# Patient Record
Sex: MALE | Race: WHITE | NOT HISPANIC OR LATINO | Employment: FULL TIME | URBAN - METROPOLITAN AREA
[De-identification: names, ages, dates, MRNs, and addresses within clinical notes are randomized per-mention and may not be internally consistent; named-entity substitution may affect disease eponyms.]

---

## 2018-02-13 ENCOUNTER — OFFICE VISIT (OUTPATIENT)
Dept: URGENT CARE | Facility: CLINIC | Age: 28
End: 2018-02-13
Payer: COMMERCIAL

## 2018-02-13 VITALS
OXYGEN SATURATION: 98 % | HEART RATE: 82 BPM | DIASTOLIC BLOOD PRESSURE: 77 MMHG | WEIGHT: 215 LBS | SYSTOLIC BLOOD PRESSURE: 117 MMHG | BODY MASS INDEX: 34.55 KG/M2 | RESPIRATION RATE: 18 BRPM | TEMPERATURE: 97.8 F | HEIGHT: 66 IN

## 2018-02-13 DIAGNOSIS — M62.838 MUSCLE SPASM: Primary | ICD-10-CM

## 2018-02-13 PROCEDURE — 99213 OFFICE O/P EST LOW 20 MIN: CPT | Performed by: FAMILY MEDICINE

## 2018-02-13 RX ORDER — CYCLOBENZAPRINE HCL 10 MG
10 TABLET ORAL 3 TIMES DAILY PRN
Status: DISCONTINUED | OUTPATIENT
Start: 2018-02-13 | End: 2018-02-13

## 2018-02-13 RX ORDER — METHYLPREDNISOLONE 4 MG/1
TABLET ORAL
Qty: 21 TABLET | Refills: 0 | Status: ON HOLD | OUTPATIENT
Start: 2018-02-13 | End: 2020-02-20 | Stop reason: ALTCHOICE

## 2018-02-13 RX ORDER — CYCLOBENZAPRINE HCL 10 MG
10 TABLET ORAL 3 TIMES DAILY PRN
Qty: 30 TABLET | Refills: 0 | Status: ON HOLD | OUTPATIENT
Start: 2018-02-13 | End: 2020-02-20 | Stop reason: ALTCHOICE

## 2018-02-13 NOTE — PATIENT INSTRUCTIONS
We are treating this as a spasm of the right paralumbar muscle group  Use medications as written  Heat or ice as tolerated  Take it easy for the next 2-3 days

## 2018-02-13 NOTE — PROGRESS NOTES
Assessment/Plan:      Diagnoses and all orders for this visit:    Muscle spasm  -     Discontinue: cyclobenzaprine (FLEXERIL) tablet 10 mg; Take 1 tablet (10 mg total) by mouth 3 (three) times a day as needed for muscle spasms   -     Methylprednisolone 4 MG TBPK; Use as directed on package  -     cyclobenzaprine (FLEXERIL) 10 mg tablet; Take 1 tablet (10 mg total) by mouth 3 (three) times a day as needed for muscle spasms          Subjective:     Patient ID: Chey Morrison is a 25 y o  male  Patient is a 41-year-old male  who what Friday when he got into his truck good felt some increased discomfort in the right mid back  He moves tires  Does not recall a specific injury  He has had pain all weekend  He has been quite limited  There is no radiation of the pain into the legs or buttocks  Review of Systems   Constitutional: Negative  HENT: Negative  Eyes: Negative  Respiratory: Negative  Cardiovascular: Negative  Musculoskeletal: Positive for back pain and myalgias  Objective:     Physical Exam   Constitutional: He appears well-developed and well-nourished  HENT:   Head: Normocephalic and atraumatic  Eyes: Conjunctivae are normal  Pupils are equal, round, and reactive to light  Neck: Normal range of motion  Pulmonary/Chest: Effort normal    Musculoskeletal:   He is moving slowly and stiffly  There is increased spasm in the right paralumbar muscle area

## 2018-02-13 NOTE — LETTER
February 15, 2018 ret a had slight edema so with fluid he is at she did she did urn to work in 2-3 days if able  Otherwise follow-up with your family physician  Patient: Carrie Course   YOB: 1990   Date of Visit: 2/13/2018       To Whom It May Concern: It is my medical opinion that Carrie Course a few right and left flank CC reflex hand and right leg and read right got reason  If you have any questions or concerns, please don't hesitate to call           Sincerely,        Eddie Meyer MD    CC: Referral Self

## 2018-02-13 NOTE — LETTER
February 15, 2018     Patient: Ruma Taylor   YOB: 1990   Date of Visit: 2/13/2018       To Whom It May Concern: It is my medical opinion that Ruma Taylor may return to work on 02/15/2018  If you have any questions or concerns, please don't hesitate to call           Sincerely,        Derrick Ramírez MD    CC: No Recipients

## 2020-02-20 ENCOUNTER — APPOINTMENT (EMERGENCY)
Dept: RADIOLOGY | Facility: HOSPITAL | Age: 30
DRG: 603 | End: 2020-02-20
Payer: COMMERCIAL

## 2020-02-20 ENCOUNTER — HOSPITAL ENCOUNTER (INPATIENT)
Facility: HOSPITAL | Age: 30
LOS: 5 days | Discharge: HOME | DRG: 603 | End: 2020-02-25
Attending: EMERGENCY MEDICINE | Admitting: INTERNAL MEDICINE
Payer: COMMERCIAL

## 2020-02-20 DIAGNOSIS — L02.612: ICD-10-CM

## 2020-02-20 DIAGNOSIS — L03.116 CELLULITIS OF LEFT FOOT: Primary | ICD-10-CM

## 2020-02-20 PROBLEM — Z72.0 TOBACCO USE: Status: ACTIVE | Noted: 2020-02-20

## 2020-02-20 LAB
ALBUMIN SERPL BCP-MCNC: 4 G/DL (ref 3.5–5)
ALP SERPL-CCNC: 55 U/L (ref 46–116)
ALT SERPL W P-5'-P-CCNC: 64 U/L (ref 12–78)
ANION GAP SERPL CALCULATED.3IONS-SCNC: 6 MMOL/L (ref 4–13)
AST SERPL W P-5'-P-CCNC: 20 U/L (ref 5–45)
BASOPHILS # BLD AUTO: 0.04 THOUSANDS/ΜL (ref 0–0.1)
BASOPHILS NFR BLD AUTO: 0 % (ref 0–1)
BILIRUB SERPL-MCNC: 0.6 MG/DL (ref 0.2–1)
BUN SERPL-MCNC: 10 MG/DL (ref 5–25)
CALCIUM SERPL-MCNC: 9.1 MG/DL (ref 8.3–10.1)
CHLORIDE SERPL-SCNC: 102 MMOL/L (ref 100–108)
CO2 SERPL-SCNC: 30 MMOL/L (ref 21–32)
CREAT SERPL-MCNC: 1 MG/DL (ref 0.6–1.3)
CRP SERPL HS-MCNC: 64.8 MG/L
CRP SERPL QL: 70.4 MG/L
EOSINOPHIL # BLD AUTO: 0.26 THOUSAND/ΜL (ref 0–0.61)
EOSINOPHIL NFR BLD AUTO: 3 % (ref 0–6)
ERYTHROCYTE [DISTWIDTH] IN BLOOD BY AUTOMATED COUNT: 13.2 % (ref 11.6–15.1)
ERYTHROCYTE [SEDIMENTATION RATE] IN BLOOD: 12 MM/HOUR (ref 0–10)
EST. AVERAGE GLUCOSE BLD GHB EST-MCNC: 100 MG/DL
GFR SERPL CREATININE-BSD FRML MDRD: 100 ML/MIN/1.73SQ M
GLUCOSE SERPL-MCNC: 108 MG/DL (ref 65–140)
HBA1C MFR BLD: 5.1 %
HCT VFR BLD AUTO: 46.1 % (ref 36.5–49.3)
HGB BLD-MCNC: 15.9 G/DL (ref 12–17)
IMM GRANULOCYTES # BLD AUTO: 0.03 THOUSAND/UL (ref 0–0.2)
IMM GRANULOCYTES NFR BLD AUTO: 0 % (ref 0–2)
LYMPHOCYTES # BLD AUTO: 1.77 THOUSANDS/ΜL (ref 0.6–4.47)
LYMPHOCYTES NFR BLD AUTO: 18 % (ref 14–44)
MCH RBC QN AUTO: 30.5 PG (ref 26.8–34.3)
MCHC RBC AUTO-ENTMCNC: 34.5 G/DL (ref 31.4–37.4)
MCV RBC AUTO: 89 FL (ref 82–98)
MONOCYTES # BLD AUTO: 0.7 THOUSAND/ΜL (ref 0.17–1.22)
MONOCYTES NFR BLD AUTO: 7 % (ref 4–12)
NEUTROPHILS # BLD AUTO: 7.34 THOUSANDS/ΜL (ref 1.85–7.62)
NEUTS SEG NFR BLD AUTO: 72 % (ref 43–75)
NRBC BLD AUTO-RTO: 0 /100 WBCS
PLATELET # BLD AUTO: 229 THOUSANDS/UL (ref 149–390)
PMV BLD AUTO: 9.2 FL (ref 8.9–12.7)
POTASSIUM SERPL-SCNC: 3.8 MMOL/L (ref 3.5–5.3)
PROT SERPL-MCNC: 8.3 G/DL (ref 6.4–8.2)
RBC # BLD AUTO: 5.21 MILLION/UL (ref 3.88–5.62)
SODIUM SERPL-SCNC: 138 MMOL/L (ref 136–145)
WBC # BLD AUTO: 10.14 THOUSAND/UL (ref 4.31–10.16)

## 2020-02-20 PROCEDURE — 73630 X-RAY EXAM OF FOOT: CPT

## 2020-02-20 PROCEDURE — 80053 COMPREHEN METABOLIC PANEL: CPT | Performed by: EMERGENCY MEDICINE

## 2020-02-20 PROCEDURE — 87040 BLOOD CULTURE FOR BACTERIA: CPT | Performed by: INTERNAL MEDICINE

## 2020-02-20 PROCEDURE — 86141 C-REACTIVE PROTEIN HS: CPT | Performed by: EMERGENCY MEDICINE

## 2020-02-20 PROCEDURE — 99223 1ST HOSP IP/OBS HIGH 75: CPT | Performed by: INTERNAL MEDICINE

## 2020-02-20 PROCEDURE — 99285 EMERGENCY DEPT VISIT HI MDM: CPT | Performed by: EMERGENCY MEDICINE

## 2020-02-20 PROCEDURE — 85025 COMPLETE CBC W/AUTO DIFF WBC: CPT | Performed by: EMERGENCY MEDICINE

## 2020-02-20 PROCEDURE — 85652 RBC SED RATE AUTOMATED: CPT | Performed by: INTERNAL MEDICINE

## 2020-02-20 PROCEDURE — 99284 EMERGENCY DEPT VISIT MOD MDM: CPT

## 2020-02-20 PROCEDURE — 36415 COLL VENOUS BLD VENIPUNCTURE: CPT | Performed by: EMERGENCY MEDICINE

## 2020-02-20 PROCEDURE — 83036 HEMOGLOBIN GLYCOSYLATED A1C: CPT | Performed by: EMERGENCY MEDICINE

## 2020-02-20 PROCEDURE — 86140 C-REACTIVE PROTEIN: CPT | Performed by: INTERNAL MEDICINE

## 2020-02-20 RX ORDER — CEFAZOLIN SODIUM 1 G/50ML
1000 SOLUTION INTRAVENOUS EVERY 8 HOURS
Status: DISCONTINUED | OUTPATIENT
Start: 2020-02-20 | End: 2020-02-25 | Stop reason: HOSPADM

## 2020-02-20 RX ORDER — CLINDAMYCIN PHOSPHATE 600 MG/50ML
600 INJECTION INTRAVENOUS EVERY 8 HOURS
Status: DISCONTINUED | OUTPATIENT
Start: 2020-02-20 | End: 2020-02-25 | Stop reason: HOSPADM

## 2020-02-20 RX ORDER — NICOTINE 21 MG/24HR
1 PATCH, TRANSDERMAL 24 HOURS TRANSDERMAL DAILY
Status: DISCONTINUED | OUTPATIENT
Start: 2020-02-20 | End: 2020-02-25 | Stop reason: HOSPADM

## 2020-02-20 RX ORDER — SODIUM CHLORIDE 9 MG/ML
100 INJECTION, SOLUTION INTRAVENOUS CONTINUOUS
Status: DISCONTINUED | OUTPATIENT
Start: 2020-02-20 | End: 2020-02-21

## 2020-02-20 RX ORDER — ONDANSETRON 2 MG/ML
4 INJECTION INTRAMUSCULAR; INTRAVENOUS EVERY 6 HOURS PRN
Status: DISCONTINUED | OUTPATIENT
Start: 2020-02-20 | End: 2020-02-25 | Stop reason: HOSPADM

## 2020-02-20 RX ORDER — SULFAMETHOXAZOLE AND TRIMETHOPRIM 800; 160 MG/1; MG/1
1 TABLET ORAL EVERY 12 HOURS SCHEDULED
Status: ON HOLD | COMMUNITY
End: 2020-02-25 | Stop reason: SDUPTHER

## 2020-02-20 RX ORDER — ACETAMINOPHEN 325 MG/1
650 TABLET ORAL EVERY 6 HOURS PRN
Status: DISCONTINUED | OUTPATIENT
Start: 2020-02-20 | End: 2020-02-25 | Stop reason: HOSPADM

## 2020-02-20 RX ORDER — KETOROLAC TROMETHAMINE 30 MG/ML
15 INJECTION, SOLUTION INTRAMUSCULAR; INTRAVENOUS ONCE
Status: COMPLETED | OUTPATIENT
Start: 2020-02-20 | End: 2020-02-20

## 2020-02-20 RX ORDER — NAPROXEN 500 MG/1
500 TABLET ORAL DAILY PRN
COMMUNITY
End: 2020-02-25 | Stop reason: HOSPADM

## 2020-02-20 RX ADMIN — NICOTINE 1 PATCH: 21 PATCH, EXTENDED RELEASE TRANSDERMAL at 16:28

## 2020-02-20 RX ADMIN — CEFAZOLIN SODIUM 1000 MG: 1 SOLUTION INTRAVENOUS at 16:28

## 2020-02-20 RX ADMIN — KETOROLAC TROMETHAMINE 15 MG: 30 INJECTION, SOLUTION INTRAMUSCULAR; INTRAVENOUS at 22:57

## 2020-02-20 RX ADMIN — CLINDAMYCIN IN 5 PERCENT DEXTROSE 600 MG: 12 INJECTION, SOLUTION INTRAVENOUS at 18:15

## 2020-02-20 RX ADMIN — SODIUM CHLORIDE 100 ML/HR: 0.9 INJECTION, SOLUTION INTRAVENOUS at 16:29

## 2020-02-20 RX ADMIN — ACETAMINOPHEN 650 MG: 325 TABLET ORAL at 19:35

## 2020-02-20 RX ADMIN — MICONAZOLE NITRATE 1 APPLICATION: 20 CREAM TOPICAL at 18:14

## 2020-02-20 RX ADMIN — ENOXAPARIN SODIUM 40 MG: 40 INJECTION SUBCUTANEOUS at 16:28

## 2020-02-20 NOTE — PLAN OF CARE
Problem: PAIN - ADULT  Goal: Verbalizes/displays adequate comfort level or baseline comfort level  Description  Interventions:  - Encourage patient to monitor pain and request assistance  - Assess pain using appropriate pain scale  - Administer analgesics based on type and severity of pain and evaluate response  - Implement non-pharmacological measures as appropriate and evaluate response  - Consider cultural and social influences on pain and pain management  - Notify physician/advanced practitioner if interventions unsuccessful or patient reports new pain  Outcome: Progressing     Problem: INFECTION - ADULT  Goal: Absence or prevention of progression during hospitalization  Description  INTERVENTIONS:  - Assess and monitor for signs and symptoms of infection  - Monitor lab/diagnostic results  - Monitor all insertion sites, i e  indwelling lines, tubes, and drains  - Monitor endotracheal if appropriate and nasal secretions for changes in amount and color  - [place] appropriate cooling/warming therapies per order  - Administer medications as ordered  - Instruct and encourage patient and family to use good hand hygiene technique  - Identify and instruct in appropriate isolation precautions for identified infection/condition  Outcome: Progressing  Goal: Absence of fever/infection during neutropenic period  Description  INTERVENTIONS:  - Monitor WBC    Outcome: Progressing

## 2020-02-20 NOTE — H&P
H&P- Bruce Whitley 1990, 30 y o  male MRN: [id number]    Unit/Bed#: -01 Encounter: [id number]    Primary Care Provider: No primary care provider on file  Date and time admitted to hospital: 2/20/2020 12:48 PM        Tobacco use  Assessment & Plan  Smoking cessation counseling given  On nicotine patch    * Cellulitis of left foot  Assessment & Plan  Patient with left foot cellulitis  Was on Bactrim since Tuesday with worsening cellulitis  IV antibiotics with Ancef and Cleocin  Elevate left lower extremity  Follow-up culture results  ID input  ESR and CRP  Gentle IV fluids  Monitor renal function in am  Will order miconazole for Tinea pedis    Anticipated length of stay greater than 2 midnights  Chief Complaint   Patient presents with    Foot Swelling     Pt with left foot swelling and pain since friday 2/14/20, denies injury  [facility] and is here today for continued swelling and pain  Has been on naproxyn and bactrim since 2/18/20  HPI:  Bruce Whitley is a 30 y o  male who presents to emergency department with complain of worsening pain, swelling, warmth and erythema of left foot  Patient states that he had cellulitis of left foot in November 2019 and was seen by Podiatry as outpatient and was given IV steroids/oral antibiotics with resolution of cellulitis  He admits that he has history of tenia pedis and ends up feeling very itchy and scratches his feet especially in between his toes a lot which leads on to skin breakdown  He states that last time he had cellulitis secondary to it  He states that he noticed swelling of his left 4th toe last Friday and then started noticing having lot of itching and scratching and subsequently developed erythema, swelling, warmth and pain on his left foot which continued to become worse and on Tuesday he went to urgent care and was prescribed Bactrim DS and naproxen    He states that he is taking his antibiotic but symptoms became worse and his left lower foot became more swollen and erythematous, tender to touch and he came to ER  Denies any associated fever, chills, chest pain, nausea, vomiting, abdominal pain, shortness of breath or any other symptoms  Patient had x-ray of left foot done in ER which showed mild soft tissue swelling  Historical Information   Past Medical History:   Diagnosis Date    Arthritis      History reviewed  No pertinent surgical history  Social History   Social History     Substance and Sexual Activity   Alcohol Use Yes    Alcohol/week: 12 0 standard drinks    Types: 12 Cans of beer per week    Frequency: 2-4 times a month    Drinks per session: 10 or more    Binge frequency: Less than monthly    Comment: 12 pack per occasion     Social History     Substance and Sexual Activity   Drug Use No     Social History     Tobacco Use   Smoking Status Current Every Day Smoker    Packs/day: 2 00    Types: Cigarettes   Smokeless Tobacco Current User     History reviewed  No pertinent family history      Meds/Allergies   No Known Allergies    Meds:    Current Facility-Administered Medications:     acetaminophen (TYLENOL) tablet 650 mg, 650 mg, Oral, Q6H PRN, Queen Yuval MD    ceFAZolin (ANCEF) IVPB (premix) 1,000 mg, 1,000 mg, Intravenous, Q8H, Queen Yuval MD    enoxaparin (LOVENOX) subcutaneous injection 40 mg, 40 mg, Subcutaneous, Daily, Queen Yuval MD    nicotine (NICODERM CQ) 21 mg/24 hr TD 24 hr patch 1 patch, 1 patch, Transdermal, Daily, Queen Yuval MD    ondansetron (ZOFRAN) injection 4 mg, 4 mg, Intravenous, Q6H PRN, Queen Yuval MD    sodium chloride 0 9 % infusion, 100 mL/hr, Intravenous, Continuous, Queen Yuval MD    vancomycin (VANCOCIN) 1,250 mg in sodium chloride 0 9 % 250 mL IVPB, 15 mg/kg (Adjusted), Intravenous, Q8H, Queen Yuval MD    Medications Prior to Admission   Medication    naproxen (NAPROSYN) 500 mg tablet    sulfamethoxazole-trimethoprim (BACTRIM DS) 800-160 mg per tablet         Review of Systems   Constitutional: Positive for activity change  HENT: Negative  Eyes: Negative  Respiratory: Negative  Cardiovascular: Negative  Gastrointestinal: Negative  Endocrine: Negative  Genitourinary: Negative  Musculoskeletal: Negative  Skin: Positive for color change, pallor and rash  Allergic/Immunologic: Negative  Neurological: Negative  Hematological: Negative  Psychiatric/Behavioral: Negative  Current Vitals:   Blood Pressure: 137/84 (02/20/20 1552)  Pulse: 76 (02/20/20 1552)  Temperature: 97 9 °F (36 6 °C) (02/20/20 1552)  Temp Source: Temporal (02/20/20 1251)  Respirations: 16 (02/20/20 1500)  Height: 5' 6" (167 6 cm) (02/20/20 1251)  Weight - Scale: 104 kg (230 lb) (02/20/20 1600)  SpO2: 97 % (02/20/20 1552)  SPO2 RA Rest      ED to Hosp-Admission (Current) from 2/20/2020 in [facility] [address]   SpO2  97 %   SpO2 Activity  At Rest   O2 Device  None (Room air)   O2 Flow Rate          No intake or output data in the 24 hours ending 02/20/20 1610  Body mass index is 37 12 kg/m²  Physical Exam   Constitutional: He is oriented to person, place, and time  He appears well-developed and well-nourished  No distress  HENT:   Head: Normocephalic and atraumatic  Mouth/Throat: Oropharynx is clear and moist    Eyes: Pupils are equal, round, and reactive to light  Conjunctivae and EOM are normal  No scleral icterus  Neck: Normal range of motion  Neck supple  No JVD present  Cardiovascular: Normal rate, regular rhythm, normal heart sounds and intact distal pulses  Pulmonary/Chest: Effort normal and breath sounds normal  No respiratory distress  He has no wheezes  He has no rales  He exhibits no tenderness  Abdominal: Soft  Bowel sounds are normal  He exhibits no mass  There is no tenderness  There is no rebound and no guarding     Musculoskeletal: Normal range of motion  He exhibits no edema, tenderness or deformity  Lymphadenopathy:     He has no cervical adenopathy  Neurological: He is alert and oriented to person, place, and time  No cranial nerve deficit  Coordination normal    No focal deficits   Skin: Skin is warm  No rash noted  No erythema  No pallor  Left foot-  Swollen, Erythema, warmth, painful to touch   Psychiatric: He has a normal mood and affect  His behavior is normal  Judgment normal    Nursing note and vitals reviewed  Lab Results:   CBC:   Lab Results   Component Value Date    WBC 10 14 02/20/2020    HGB 15 9 02/20/2020    HCT 46 1 02/20/2020    MCV 89 02/20/2020     02/20/2020    MCH 30 5 02/20/2020    MCHC 34 5 02/20/2020    RDW 13 2 02/20/2020    MPV 9 2 02/20/2020    NRBC 0 02/20/2020     CMP:  Lab Results   Component Value Date     02/20/2020    CO2 30 02/20/2020    BUN 10 02/20/2020    CREATININE 1 00 02/20/2020    CALCIUM 9 1 02/20/2020    AST 20 02/20/2020    ALT 64 02/20/2020    ALKPHOS 55 02/20/2020    EGFR 100 02/20/2020     No results found for: TROPONINI, CKMB, CKTOTAL  Coagulation: No results found for: PT, INR, APTT Urinalysis:No results found for: COLORU, CLARITYU, SPECGRAV, PHUR, LEUKOCYTESUR, NITRITE, PROTEINUA, GLUCOSEU, KETONESU, BILIRUBINUR, BLOODU   Amylase: No results found for: AMYLASE  Lipase: No results found for: LIPASE     Imaging: Xr Foot 3+ Views Left    Result Date: 2/20/2020  Narrative: LEFT FOOT INDICATION:   warmth, edema, erythema of left foot, r/o signs of osteomyelitis  COMPARISON:  None VIEWS:  XR FOOT 3+ VW LEFT FINDINGS: There is no acute fracture or dislocation  Small plantar calcaneal spur  No significant degenerative changes  No lytic or blastic lesions seen  Mild diffuse soft tissue swelling along the dorsum of foot  Impression: No acute osseous abnormality  Mild soft tissue swelling in the dorsum of foot   Workstation performed: [id number]     EKG, Pathology, and Other Studies: I have personally reviewed the results  VTE Pharmacologic Prophylaxis: Enoxaparin (Lovenox)  VTE Mechanical Prophylaxis: sequential compression device    Code Status: Level 1 - Full Code    Counseling / Coordination of Care  Total floor / unit time spent today 75 minutes  Greater than 50% of total time was spent with the patient and / or family counseling and / or coordination of care       "This note has been constructed using a voice recognition system"      Marissa Gustafson MD  2/20/2020, 4:10 PM

## 2020-02-20 NOTE — CONSULTS
This patient's vancomycin therapy has been discontinued  Thank you for this consult, pharmacy will sign off now

## 2020-02-20 NOTE — ASSESSMENT & PLAN NOTE
Patient with left foot cellulitis  Was on Bactrim since Tuesday with worsening cellulitis    IV antibiotics with Ancef and Cleocin  Elevate left lower extremity  Follow-up culture results  ID input  ESR and CRP  Gentle IV fluids  Monitor renal function in am  Will order miconazole for Tinea pedis

## 2020-02-20 NOTE — ED PROVIDER NOTES
History  Chief Complaint   Patient presents with    Foot Swelling     Pt with left foot swelling and pain since friday 2/14/20, denies injury  [facility] and is here today for continued swelling and pain  Has been on naproxyn and bactrim since 2/18/20  This healthy 77-year-old man complains of pain, swelling, warmth and erythema of his left foot  This began 6 days ago  This started as tenderness and swelling at the base of his left 4th toe  It now has extended across the dorsum of his foot and his other toes  He has had history of tinea pedis and over the past week or two this has become very severe  He has been scratching the area briskly  He was treated for cellulitis in the left 4th toe after it had become cellulitic several months ago  Symptoms resolved after that treatment  He denies recent fever or other constitutional symptoms  He has been taking Bactrim elevating his foot for the last 2 days but symptoms have worsened  Prior to Admission Medications   Prescriptions Last Dose Informant Patient Reported? Taking? Methylprednisolone 4 MG TBPK Not Taking at Unknown time  No No   Sig: Use as directed on package   Patient not taking: Reported on 2/20/2020   cyclobenzaprine (FLEXERIL) 10 mg tablet Not Taking at Unknown time  No No   Sig: Take 1 tablet (10 mg total) by mouth 3 (three) times a day as needed for muscle spasms   Patient not taking: Reported on 2/20/2020      Facility-Administered Medications: None       Past Medical History:   Diagnosis Date    Arthritis        History reviewed  No pertinent surgical history  History reviewed  No pertinent family history  I have reviewed and agree with the history as documented      Social History     Tobacco Use    Smoking status: Current Every Day Smoker     Packs/day: 2 00     Types: Cigarettes    Smokeless tobacco: Current User   Substance Use Topics    Alcohol use: Yes     Comment: occasionally    Drug use: No       Review of Systems   Constitutional: Negative  HENT: Negative  Eyes: Negative  Respiratory: Negative  Cardiovascular: Negative  Gastrointestinal: Negative  Endocrine: Negative  Genitourinary: Negative  Musculoskeletal: Positive for myalgias  Negative for back pain, neck pain and neck stiffness  Skin: Positive for color change (Left foot erythema)  Allergic/Immunologic: Negative  Neurological: Negative  Hematological: Negative  Psychiatric/Behavioral: Negative  All other systems reviewed and are negative  Physical Exam  Physical Exam   Constitutional: He is oriented to person, place, and time  He appears well-developed and well-nourished  No distress  HENT:   Head: Normocephalic and atraumatic  Right Ear: External ear normal    Left Ear: External ear normal    Mouth/Throat: Oropharynx is clear and moist    Eyes: Pupils are equal, round, and reactive to light  Conjunctivae and EOM are normal    Neck: Normal range of motion  Neck supple  No JVD present  Cardiovascular: Normal rate, regular rhythm, normal heart sounds and intact distal pulses  No murmur heard  Pulmonary/Chest: Effort normal and breath sounds normal    Abdominal: Soft  Bowel sounds are normal  He exhibits no mass  There is no tenderness  There is no rebound and no guarding  Musculoskeletal: Normal range of motion  He exhibits edema and tenderness  Swelling warmth erythema tenderness of the dorsum of left foot and toes of the left foot  No lymphangitic streaking  There is maceration in the 3rd, 4th and 5th web spaces  No blistering or drainage  Lymphadenopathy:     He has no cervical adenopathy  Neurological: He is alert and oriented to person, place, and time  He has normal reflexes  No cranial nerve deficit or sensory deficit  He exhibits normal muscle tone  Coordination normal    Skin: Skin is warm and dry  Capillary refill takes less than 2 seconds  No rash noted   He is not diaphoretic  There is erythema (Toes and dorsum of left foot)  Psychiatric: He has a normal mood and affect  His behavior is normal    Nursing note and vitals reviewed                Vital Signs  ED Triage Vitals [02/20/20 1251]   Temperature Pulse Respirations Blood Pressure SpO2   98 2 °F (36 8 °C) 90 16 145/78 99 %      Temp Source Heart Rate Source Patient Position - Orthostatic VS BP Location FiO2 (%)   Temporal Monitor Sitting Right arm --      Pain Score       Worst Possible Pain           Vitals:    02/20/20 1251   BP: 145/78   Pulse: 90   Patient Position - Orthostatic VS: Sitting         Visual Acuity      ED Medications  Medications - No data to display    Diagnostic Studies  Results Reviewed     Procedure Component Value Units Date/Time    Comprehensive metabolic panel [[id number]]  (Abnormal) Collected:  02/20/20 1326    Lab Status:  Final result Specimen:  Blood from Arm, Right Updated:  02/20/20 1354     Sodium 138 mmol/L      Potassium 3 8 mmol/L      Chloride 102 mmol/L      CO2 30 mmol/L      ANION GAP 6 mmol/L      BUN 10 mg/dL      Creatinine 1 00 mg/dL      Glucose 108 mg/dL      Calcium 9 1 mg/dL      AST 20 U/L      ALT 64 U/L      Alkaline Phosphatase 55 U/L      Total Protein 8 3 g/dL      Albumin 4 0 g/dL      Total Bilirubin 0 60 mg/dL      eGFR 100 ml/min/1 73sq m     Narrative:       [place] guidelines for Chronic Kidney Disease (CKD):     Stage 1 with normal or high GFR (GFR > 90 mL/min/1 73 square meters)    Stage 2 Mild CKD (GFR = 60-89 mL/min/1 73 square meters)    Stage 3A Moderate CKD (GFR = 45-59 mL/min/1 73 square meters)    Stage 3B Moderate CKD (GFR = 30-44 mL/min/1 73 square meters)    Stage 4 Severe CKD (GFR = 15-29 mL/min/1 73 square meters)    Stage 5 End Stage CKD (GFR <15 mL/min/1 73 square meters)  Note: GFR calculation is accurate only with a steady state creatinine    CBC and differential [[id number]] Collected:  02/20/20 1326    Lab Status:  Final result Specimen:  Blood from Arm, Right Updated:  02/20/20 1339     WBC 10 14 Thousand/uL      RBC 5 21 Million/uL      Hemoglobin 15 9 g/dL      Hematocrit 46 1 %      MCV 89 fL      MCH 30 5 pg      MCHC 34 5 g/dL      RDW 13 2 %      MPV 9 2 fL      Platelets 682 Thousands/uL      nRBC 0 /100 WBCs      Neutrophils Relative 72 %      Immat GRANS % 0 %      Lymphocytes Relative 18 %      Monocytes Relative 7 %      Eosinophils Relative 3 %      Basophils Relative 0 %      Neutrophils Absolute 7 34 Thousands/µL      Immature Grans Absolute 0 03 Thousand/uL      Lymphocytes Absolute 1 77 Thousands/µL      Monocytes Absolute 0 70 Thousand/µL      Eosinophils Absolute 0 26 Thousand/µL      Basophils Absolute 0 04 Thousands/µL     High sensitivity CRP [[id number]] Collected:  02/20/20 1326    Lab Status: In process Specimen:  Blood from Arm, Right Updated:  02/20/20 1334    Hemoglobin A1C [[id number]] Collected:  02/20/20 1326    Lab Status: In process Specimen:  Blood from Arm, Right Updated:  02/20/20 1334                 XR foot 3+ views LEFT   Final Result by Rafy Anthony MD (02/20 1434)      No acute osseous abnormality  Mild soft tissue swelling in the dorsum of foot        Workstation performed: [id number]                    Procedures  Procedures         ED Course                               MDM  Number of Diagnoses or Management Options  Cellulitis of left foot: new and requires workup     Amount and/or Complexity of Data Reviewed  Clinical lab tests: ordered and reviewed  Tests in the radiology section of CPT®: ordered and reviewed  Discuss the patient with other providers: yes  Independent visualization of images, tracings, or specimens: yes          Disposition  Final diagnoses:   Cellulitis of left foot     Time reflects when diagnosis was documented in both MDM as applicable and the Disposition within this note     Time User Action Codes Description Comment    2/20/2020  2:46 PM Svitlana Ware Add [U64 133] Cellulitis of left foot       ED Disposition     ED Disposition Condition Date/Time Comment    Admit Stable Thu Feb 20, 2020  2:46 PM Case was discussed with Dr Law Diop and the patient's admission status was agreed to be Admission Status: inpatient status to the service of Dr Law Diop   Follow-up Information    None         Patient's Medications   Discharge Prescriptions    No medications on file     No discharge procedures on file      PDMP Review     None          ED Provider  Electronically Signed by           Liz Bae DO  02/20/20 0570

## 2020-02-20 NOTE — LETTER
[facility] MED SURG UNIT  [address]   [place] [address]  Dept: [phone]    February 25, 2020     Patient: Jaydon Wagner   YOB: 1990   Date of Visit: 2/20/2020       To Whom it May Concern:    Jaydon Wagner is under my professional care  He was seen in the hospital from 2/20/2020   to 02/25/20  He may return to school on Friday 2/28/2020 without limitations  If you have any questions or concerns, please don't hesitate to call           Sincerely,          Dariela Salmreon

## 2020-02-20 NOTE — PROGRESS NOTES
Vancomycin Assessment    Omayra Casey is a 30 y o  male who is currently receiving vancomycin 1500mg IV Q12H  for skin-soft tissue infection     Relevant clinical data and objective history reviewed:  Creatinine   Date Value Ref Range Status   02/20/2020 1 00 0 60 - 1 30 mg/dL Final     Comment:     Standardized to IDMS reference method     /84   Pulse 76   Temp 97 9 °F (36 6 °C)   Resp 16   Ht 5' 6" (1 676 m)   Wt 104 kg (230 lb)   SpO2 97%   BMI 37 12 kg/m²   No intake/output data recorded  Lab Results   Component Value Date/Time    BUN 10 02/20/2020 01:26 PM    WBC 10 14 02/20/2020 01:26 PM    HGB 15 9 02/20/2020 01:26 PM    HCT 46 1 02/20/2020 01:26 PM    MCV 89 02/20/2020 01:26 PM     02/20/2020 01:26 PM     Temp Readings from Last 3 Encounters:   02/20/20 97 9 °F (36 6 °C)   02/13/18 97 8 °F (36 6 °C) (Tympanic)     Vancomycin Days of Therapy: 1    Assessment/Plan  The patient is currently on vancomycin utilizing scheduled dosing based on adjusted body weight (due to obesity)  Baseline risks associated with therapy include: none   The patient is currently receiving 1500mg IV Q12H  and after clinical evaluation will be changed to 1250mg IV Q8H  Pharmacy will also follow closely for s/sx of nephrotoxicity, infusion reactions and appropriateness of therapy  BMP and CBC will be ordered per protocol  Plan for trough as patient approaches steady state, prior to the 4th  dose at approximately 1600 on 2/21/20  Due to infection severity, will target a trough of 15-20 (appropriate for most indications)   Pharmacy will continue to follow the patients culture results and clinical progress daily      Dinorah Rivas, Pharmacist

## 2020-02-21 ENCOUNTER — APPOINTMENT (INPATIENT)
Dept: MRI IMAGING | Facility: HOSPITAL | Age: 30
DRG: 603 | End: 2020-02-21
Payer: COMMERCIAL

## 2020-02-21 PROBLEM — L02.612: Status: ACTIVE | Noted: 2020-02-20

## 2020-02-21 LAB
ALBUMIN SERPL BCP-MCNC: 3.6 G/DL (ref 3.5–5)
ALP SERPL-CCNC: 51 U/L (ref 46–116)
ALT SERPL W P-5'-P-CCNC: 57 U/L (ref 12–78)
ANION GAP SERPL CALCULATED.3IONS-SCNC: 7 MMOL/L (ref 4–13)
AST SERPL W P-5'-P-CCNC: 16 U/L (ref 5–45)
BASOPHILS # BLD AUTO: 0.05 THOUSANDS/ΜL (ref 0–0.1)
BASOPHILS NFR BLD AUTO: 1 % (ref 0–1)
BILIRUB SERPL-MCNC: 0.6 MG/DL (ref 0.2–1)
BUN SERPL-MCNC: 10 MG/DL (ref 5–25)
CALCIUM SERPL-MCNC: 9 MG/DL (ref 8.3–10.1)
CHLORIDE SERPL-SCNC: 104 MMOL/L (ref 100–108)
CO2 SERPL-SCNC: 28 MMOL/L (ref 21–32)
CREAT SERPL-MCNC: 0.99 MG/DL (ref 0.6–1.3)
EOSINOPHIL # BLD AUTO: 0.45 THOUSAND/ΜL (ref 0–0.61)
EOSINOPHIL NFR BLD AUTO: 5 % (ref 0–6)
ERYTHROCYTE [DISTWIDTH] IN BLOOD BY AUTOMATED COUNT: 13.2 % (ref 11.6–15.1)
GFR SERPL CREATININE-BSD FRML MDRD: 101 ML/MIN/1.73SQ M
GLUCOSE SERPL-MCNC: 100 MG/DL (ref 65–140)
HCT VFR BLD AUTO: 44.7 % (ref 36.5–49.3)
HGB BLD-MCNC: 15.6 G/DL (ref 12–17)
IMM GRANULOCYTES # BLD AUTO: 0.05 THOUSAND/UL (ref 0–0.2)
IMM GRANULOCYTES NFR BLD AUTO: 1 % (ref 0–2)
LYMPHOCYTES # BLD AUTO: 1.9 THOUSANDS/ΜL (ref 0.6–4.47)
LYMPHOCYTES NFR BLD AUTO: 20 % (ref 14–44)
MAGNESIUM SERPL-MCNC: 2.3 MG/DL (ref 1.6–2.6)
MCH RBC QN AUTO: 30.8 PG (ref 26.8–34.3)
MCHC RBC AUTO-ENTMCNC: 34.9 G/DL (ref 31.4–37.4)
MCV RBC AUTO: 88 FL (ref 82–98)
MONOCYTES # BLD AUTO: 0.96 THOUSAND/ΜL (ref 0.17–1.22)
MONOCYTES NFR BLD AUTO: 10 % (ref 4–12)
NEUTROPHILS # BLD AUTO: 6.26 THOUSANDS/ΜL (ref 1.85–7.62)
NEUTS SEG NFR BLD AUTO: 63 % (ref 43–75)
NRBC BLD AUTO-RTO: 0 /100 WBCS
PHOSPHATE SERPL-MCNC: 3.8 MG/DL (ref 2.7–4.5)
PLATELET # BLD AUTO: 233 THOUSANDS/UL (ref 149–390)
PMV BLD AUTO: 9.4 FL (ref 8.9–12.7)
POTASSIUM SERPL-SCNC: 4.3 MMOL/L (ref 3.5–5.3)
PROT SERPL-MCNC: 7.8 G/DL (ref 6.4–8.2)
RBC # BLD AUTO: 5.07 MILLION/UL (ref 3.88–5.62)
SODIUM SERPL-SCNC: 139 MMOL/L (ref 136–145)
WBC # BLD AUTO: 9.67 THOUSAND/UL (ref 4.31–10.16)

## 2020-02-21 PROCEDURE — 73718 MRI LOWER EXTREMITY W/O DYE: CPT

## 2020-02-21 PROCEDURE — 84100 ASSAY OF PHOSPHORUS: CPT | Performed by: INTERNAL MEDICINE

## 2020-02-21 PROCEDURE — 85025 COMPLETE CBC W/AUTO DIFF WBC: CPT | Performed by: INTERNAL MEDICINE

## 2020-02-21 PROCEDURE — 99232 SBSQ HOSP IP/OBS MODERATE 35: CPT | Performed by: INTERNAL MEDICINE

## 2020-02-21 PROCEDURE — 80053 COMPREHEN METABOLIC PANEL: CPT | Performed by: INTERNAL MEDICINE

## 2020-02-21 PROCEDURE — 83735 ASSAY OF MAGNESIUM: CPT | Performed by: INTERNAL MEDICINE

## 2020-02-21 RX ORDER — MORPHINE SULFATE 4 MG/ML
4 INJECTION, SOLUTION INTRAMUSCULAR; INTRAVENOUS EVERY 4 HOURS PRN
Status: DISCONTINUED | OUTPATIENT
Start: 2020-02-21 | End: 2020-02-21

## 2020-02-21 RX ORDER — HYDROMORPHONE HCL/PF 1 MG/ML
0.5 SYRINGE (ML) INJECTION EVERY 4 HOURS PRN
Status: DISCONTINUED | OUTPATIENT
Start: 2020-02-21 | End: 2020-02-21

## 2020-02-21 RX ADMIN — MORPHINE SULFATE 2 MG: 2 INJECTION, SOLUTION INTRAMUSCULAR; INTRAVENOUS at 22:23

## 2020-02-21 RX ADMIN — ENOXAPARIN SODIUM 40 MG: 40 INJECTION SUBCUTANEOUS at 08:24

## 2020-02-21 RX ADMIN — CLINDAMYCIN IN 5 PERCENT DEXTROSE 600 MG: 12 INJECTION, SOLUTION INTRAVENOUS at 16:21

## 2020-02-21 RX ADMIN — CEFAZOLIN SODIUM 1000 MG: 1 SOLUTION INTRAVENOUS at 23:36

## 2020-02-21 RX ADMIN — CEFAZOLIN SODIUM 1000 MG: 1 SOLUTION INTRAVENOUS at 08:24

## 2020-02-21 RX ADMIN — MORPHINE SULFATE 4 MG: 4 INJECTION INTRAVENOUS at 13:42

## 2020-02-21 RX ADMIN — MORPHINE SULFATE 4 MG: 4 INJECTION INTRAVENOUS at 09:31

## 2020-02-21 RX ADMIN — MORPHINE SULFATE 2 MG: 2 INJECTION, SOLUTION INTRAMUSCULAR; INTRAVENOUS at 04:14

## 2020-02-21 RX ADMIN — MICONAZOLE NITRATE: 20 CREAM TOPICAL at 08:26

## 2020-02-21 RX ADMIN — CEFAZOLIN SODIUM 1000 MG: 1 SOLUTION INTRAVENOUS at 16:22

## 2020-02-21 RX ADMIN — NICOTINE 1 PATCH: 21 PATCH, EXTENDED RELEASE TRANSDERMAL at 08:25

## 2020-02-21 RX ADMIN — CLINDAMYCIN IN 5 PERCENT DEXTROSE 600 MG: 12 INJECTION, SOLUTION INTRAVENOUS at 08:24

## 2020-02-21 RX ADMIN — MORPHINE SULFATE 2 MG: 2 INJECTION, SOLUTION INTRAMUSCULAR; INTRAVENOUS at 23:37

## 2020-02-21 RX ADMIN — CLINDAMYCIN IN 5 PERCENT DEXTROSE 600 MG: 12 INJECTION, SOLUTION INTRAVENOUS at 01:31

## 2020-02-21 RX ADMIN — CEFAZOLIN SODIUM 1000 MG: 1 SOLUTION INTRAVENOUS at 00:41

## 2020-02-21 NOTE — PROGRESS NOTES
Progress Note - Kenya Boles 1990, 30 y o  male MRN: [id number]    Unit/Bed#: -01 Encounter: [id number]    Primary Care Provider: No primary care provider on file  Date and time admitted to hospital: 2/20/2020 12:48 PM        Tobacco use  Assessment & Plan  Smoking cessation counseling given  On nicotine patch    * Cellulitis of left foot  Assessment & Plan  Patient with left foot cellulitis/tenia pedis  Was on Bactrim since Tuesday with worsening cellulitis  IV antibiotics with Ancef and Cleocin  Elevate left lower extremity  Follow-up culture results  ID input appreciated  DC IV fluids  Monitor renal function in am  Continue miconazole for Tinea pedis  As per infectious disease if patient is not improved will see need repeat imaging  Labs & Imaging: I have personally reviewed pertinent reports  VTE Pharmacologic Prophylaxis: Enoxaparin (Lovenox)  VTE Mechanical Prophylaxis: sequential compression device    Code Status:   Level 1 - Full Code    Patient Centered Rounds: I have performed bedside rounds with nursing staff today  Discussions with Specialists or Other Care Team Provider:  Infectious disease    Education and Discussions with Family / Patient:  Family    Current Length of Stay: 1 day(s)    Current Patient Status: Inpatient   Certification Statement: The patient will continue to require additional inpatient hospital stay due to see my assessment and plan  Subjective:   Patient is seen and examined at bedside  Left foot pain is 8/10 intensity, sharp, constant worse movement  Erythema and swelling are improving  Afebrile  All other ROS are negative  Objective:    Vitals: Blood pressure 128/90, pulse 83, temperature 98 1 °F (36 7 °C), resp  rate 20, height 5' 6" (1 676 m), weight 99 6 kg (219 lb 8 oz), SpO2 95 %  ,Body mass index is 35 43 kg/m²    SPO2 RA Rest      ED to Hosp-Admission (Current) from 2/20/2020 in [address] Med Surg Unit   SpO2  95 % SpO2 Activity  At Rest   O2 Device  None (Room air)   O2 Flow Rate          I&O:     Intake/Output Summary (Last 24 hours) at 2/21/2020 1354  Last data filed at 2/21/2020 1101  Gross per 24 hour   Intake 960 ml   Output    Net 960 ml       Physical Exam:    General- Alert, lying comfortably in bed  Not in any acute distress  HEENT- FELISA, EOM intact  Neck- Supple, No JVD  CVS- regular, S1 and S2 normal   Chest- Bilateral Air entry, No rhochi, crackles or wheezing present  Abdomen- soft, nontender, not distended, no guarding or rigidity, BS+  Left foot-swollen, erythema, warmth and painful to touch  CNS-   Alert, awake and orientedx3  No focal deficits present  Invasive Devices     Peripheral Intravenous Line            Peripheral IV 02/20/20 Right Antecubital 1 day                      Social History  reviewed  History reviewed  No pertinent family history   reviewed    Meds:  Current Facility-Administered Medications   Medication Dose Route Frequency Provider Last Rate Last Dose    acetaminophen (TYLENOL) tablet 650 mg  650 mg Oral Q6H PRN Hank Peck MD   650 mg at 02/20/20 1935    ceFAZolin (ANCEF) IVPB (premix) 1,000 mg  1,000 mg Intravenous Q8H Hank Peck  mL/hr at 02/21/20 0824 1,000 mg at 02/21/20 0824    clindamycin (CLEOCIN) IVPB (premix) 600 mg  600 mg Intravenous Q8H Favio Leigh  mL/hr at 02/21/20 0824 600 mg at 02/21/20 0824    enoxaparin (LOVENOX) subcutaneous injection 40 mg  40 mg Subcutaneous Daily Hank Peck MD   40 mg at 02/21/20 0824    HYDROmorphone (DILAUDID) injection 0 5 mg  0 5 mg Intravenous Q4H PRN Tonya Kumari PA-C        miconazole 2 % cream   Topical BID Hank Peck MD        morphine (PF) 4 mg/mL injection 4 mg  4 mg Intravenous Q4H PRN Tonya Kumari PA-C   4 mg at 02/21/20 1342    morphine injection 2 mg  2 mg Intravenous Q4H PRN Tonya Kumari PA-C   2 mg at 02/21/20 0414    nicotine (NICODERM CQ) 21 mg/24 hr TD 24 hr patch 1 patch  1 patch Transdermal Daily Muna Tirado MD   1 patch at 02/21/20 0825    ondansetron (ZOFRAN) injection 4 mg  4 mg Intravenous Q6H PRN Muna Tirado MD          Medications Prior to Admission   Medication    naproxen (NAPROSYN) 500 mg tablet    sulfamethoxazole-trimethoprim (BACTRIM DS) 800-160 mg per tablet       Labs:  Results from last 7 days   Lab Units 02/21/20  0545 02/20/20  1326   WBC Thousand/uL 9 67 10 14   HEMOGLOBIN g/dL 15 6 15 9   HEMATOCRIT % 44 7 46 1   PLATELETS Thousands/uL 233 229   NEUTROS PCT % 63 72   LYMPHS PCT % 20 18   MONOS PCT % 10 7   EOS PCT % 5 3     Results from last 7 days   Lab Units 02/21/20  0545 02/20/20  1326   POTASSIUM mmol/L 4 3 3 8   CHLORIDE mmol/L 104 102   CO2 mmol/L 28 30   BUN mg/dL 10 10   CREATININE mg/dL 0 99 1 00   CALCIUM mg/dL 9 0 9 1   ALK PHOS U/L 51 55   ALT U/L 57 64   AST U/L 16 20     No results found for: TROPONINI, CKMB, CKTOTAL      Lab Results   Component Value Date    BLOODCX Received in Microbiology Lab  Culture in Progress  02/20/2020    BLOODCX Received in Microbiology Lab  Culture in Progress  02/20/2020         Imaging:  No results found for this or any previous visit  No results found for this or any previous visit      Last 24 Hours Medication List:     Current Facility-Administered Medications:  acetaminophen 650 mg Oral Q6H PRN Muna Tirado MD    cefazolin 1,000 mg Intravenous Q8H Muna Tirado MD Last Rate: 1,000 mg (02/21/20 0824)   clindamycin 600 mg Intravenous Q8H Jaimee Hopkins MD Last Rate: 600 mg (02/21/20 0824)   enoxaparin 40 mg Subcutaneous Daily Muna Tirado MD    HYDROmorphone 0 5 mg Intravenous Q4H PRN Asif Smith PA-C    miconazole  Topical BID Muna Tirado MD    morphine injection 4 mg Intravenous Q4H PRN Asif Smith PA-C    morphine injection 2 mg Intravenous Q4H PRN Asif Smith PA-C    nicotine 1 patch Transdermal Daily Muna Tirado MD    ondansetron 4 mg Intravenous Q6H PRN Hank Peck MD         Today, Patient Was Seen By: Hank Peck MD    ** Please Note: Dictation voice to text software may have been used in the creation of this document   **

## 2020-02-21 NOTE — CONSULTS
Consultation - Infectious Disease   Jennifer Peña 30 y o  male MRN: [id number]  Unit/Bed#: -01 Encounter: [id number]      Assessment/Plan   1  Left foot cellulitis/Tinea pedis: Pt presented with increasing redness, swelling, and pain of his left foot, especially involving his left 4th toe c/w cellulitis  His underlying tinea pedis is the etiology for his current cellulitis since he was scratching btw his toes  Most likely due to Strep or Staph species  He does not have fever or elevated WBC count  He took Bactrim x 2 days without significant improvement so Strep is probably the etiology for his current presentation  A  Cont Ancef and Clinda for now - redness is starting to decrease slightly  B  Awaiting results of bld cx's  C  Cont to keep left foot elevated as much as possible  D  Will start warm compresses to his left foot  E  If he does not improve further, may need to image left foot to make sure that a small abscess is not present by his left 4th toe  F  Cont topical tx of tinea pedis    History of Present Illness   Physician Requesting Consult: Collin Chi MD  Reason for Consult / Principal Problem: Left foot pain, swelling, and redness  HPI: Jennifer Peña is a 30y o  year old male with H/O arthritis and tinea pedis who was admitted on 2/20/20 with c/o increasing left foot pain, swelling, and redness x 6 days  Pt states that his tinea pedis worsened @ his left 4th toe approx 2 weeks PTA  He started scratching btw his toes  He developed redness of his left 4th toe on 2/14/20 which spread to the rest of his left foot  He was seen at an Urgent Care center on 2/18/20 and was placed on Bactrim  His sx's did not improve and he presented to the ER for further tx  On admission, he did not have fever or elevated WBC count but had significant redness, swelling, and pain of his left foot  He was initially placed on Vanco and Ancef but abx tx was changed to Ancef and Clinda   This AM, the redness is starting to decrease but he conts to have swelling and pain  Bld cx's are neg so far    He denies cough, SOB, CP, N/V/D, abd pain, or dysuria        Inpatient consult to Infectious Diseases  Consult performed by: Jb Toledo MD  Consult ordered by: Norma Lea MD          ROS: 12 systems reviewed, remainder is neg  Historical Information   Past Medical History:   Diagnosis Date    Arthritis      History reviewed  No pertinent surgical history  Social History   Social History     Substance and Sexual Activity   Alcohol Use Yes    Alcohol/week: 12 0 standard drinks    Types: 12 Cans of beer per week    Frequency: 2-4 times a month    Drinks per session: 10 or more    Binge frequency: Less than monthly    Comment: 12 pack per occasion     Social History     Substance and Sexual Activity   Drug Use No     Social History     Tobacco Use   Smoking Status Current Every Day Smoker    Packs/day: 2 00    Types: Cigarettes   Smokeless Tobacco Current User     History reviewed  No pertinent family history      Meds/Allergies   MEDS:  Ancef: #2  Clinda: #2  Abx: @4      Current Facility-Administered Medications:     acetaminophen (TYLENOL) tablet 650 mg, 650 mg, Oral, Q6H PRN, Norma Lea MD, 650 mg at 02/20/20 1935    ceFAZolin (ANCEF) IVPB (premix) 1,000 mg, 1,000 mg, Intravenous, Q8H, Norma Lea MD, Last Rate: 100 mL/hr at 02/21/20 0824, 1,000 mg at 02/21/20 0824    clindamycin (CLEOCIN) IVPB (premix) 600 mg, 600 mg, Intravenous, Q8H, Jb Toledo MD, Last Rate: 100 mL/hr at 02/21/20 0824, 600 mg at 02/21/20 0824    enoxaparin (LOVENOX) subcutaneous injection 40 mg, 40 mg, Subcutaneous, Daily, Norma Lea MD, 40 mg at 02/21/20 0824    HYDROmorphone (DILAUDID) injection 0 5 mg, 0 5 mg, Intravenous, Q4H PRN, Dayana Brennan PA-C    miconazole 2 % cream, , Topical, BID, Norma Lea MD    morphine (PF) 4 mg/mL injection 4 mg, 4 mg, Intravenous, Q4H PRN, Dayana Brennan PA-C, 4 mg at 02/21/20 0931    morphine injection 2 mg, 2 mg, Intravenous, Q4H PRN, Chela Hess PA-C, 2 mg at 02/21/20 0414    nicotine (NICODERM CQ) 21 mg/24 hr TD 24 hr patch 1 patch, 1 patch, Transdermal, Daily, Basia Winchester MD, 1 patch at 02/21/20 0825    ondansetron (ZOFRAN) injection 4 mg, 4 mg, Intravenous, Q6H PRN, Basia Winchester MD    sodium chloride 0 9 % infusion, 100 mL/hr, Intravenous, Continuous, Basia Winchester MD, Last Rate: 100 mL/hr at 02/20/20 1629, 100 mL/hr at 02/20/20 1629    No Known Allergies      Intake/Output Summary (Last 24 hours) at 2/21/2020 1204  Last data filed at 2/21/2020 1101  Gross per 24 hour   Intake 960 ml   Output    Net 960 ml       PE:  WD, WN, WM who had left foot pain  VSS, Tmax: 98 2  HEENT:  No scleral icterus, pharynx clear  NECK: Supple  CARDIAC:  RRR, nml S1, S2  LUNGS: Clear anteriorly  ABDOMEN:  +BS, soft, nontender  EXTREMITIES:  +Swelling and redness of his left foot with tenderness of his left 4th toe  SKIN: No rash  NEURO: Grossly nonfocal    Invasive Devices:   Peripheral IV 02/20/20 Right Antecubital (Active)   Site Assessment Clean; Intact;Dry 2/21/2020  8:00 AM   Dressing Type Transparent 2/21/2020  8:00 AM   Line Status Infusing 2/21/2020  8:00 AM   Dressing Status Clean;Dry; Intact 2/21/2020  8:00 AM           Lab Results:   Admission on 02/20/2020   Component Date Value    WBC 02/20/2020 10 14     RBC 02/20/2020 5 21     Hemoglobin 02/20/2020 15 9     Hematocrit 02/20/2020 46 1     MCV 02/20/2020 89     MCH 02/20/2020 30 5     MCHC 02/20/2020 34 5     RDW 02/20/2020 13 2     MPV 02/20/2020 9 2     Platelets 96/80/1636 229     nRBC 02/20/2020 0     Neutrophils Relative 02/20/2020 72     Immat GRANS % 02/20/2020 0     Lymphocytes Relative 02/20/2020 18     Monocytes Relative 02/20/2020 7     Eosinophils Relative 02/20/2020 3     Basophils Relative 02/20/2020 0     Neutrophils Absolute 02/20/2020 7 34     Immature Grans Absolute 02/20/2020 0 03     Lymphocytes Absolute 02/20/2020 1 77     Monocytes Absolute 02/20/2020 0 70     Eosinophils Absolute 02/20/2020 0 26     Basophils Absolute 02/20/2020 0 04     CRP, High Sensitivity 02/20/2020 64 80     Sodium 02/20/2020 138     Potassium 02/20/2020 3 8     Chloride 02/20/2020 102     CO2 02/20/2020 30     ANION GAP 02/20/2020 6     BUN 02/20/2020 10     Creatinine 02/20/2020 1 00     Glucose 02/20/2020 108     Calcium 02/20/2020 9 1     AST 02/20/2020 20     ALT 02/20/2020 64     Alkaline Phosphatase 02/20/2020 55     Total Protein 02/20/2020 8 3*    Albumin 02/20/2020 4 0     Total Bilirubin 02/20/2020 0 60     eGFR 02/20/2020 100     Hemoglobin A1C 02/20/2020 5 1     EAG 02/20/2020 100     CRP 02/20/2020 70 4*    Sed Rate 02/20/2020 12*    Blood Culture 02/20/2020 Received in Microbiology Lab  Culture in Progress   Blood Culture 02/20/2020 Received in Microbiology Lab  Culture in Progress       Sodium 02/21/2020 139     Potassium 02/21/2020 4 3     Chloride 02/21/2020 104     CO2 02/21/2020 28     ANION GAP 02/21/2020 7     BUN 02/21/2020 10     Creatinine 02/21/2020 0 99     Glucose 02/21/2020 100     Calcium 02/21/2020 9 0     AST 02/21/2020 16     ALT 02/21/2020 57     Alkaline Phosphatase 02/21/2020 51     Total Protein 02/21/2020 7 8     Albumin 02/21/2020 3 6     Total Bilirubin 02/21/2020 0 60     eGFR 02/21/2020 101     Magnesium 02/21/2020 2 3     Phosphorus 02/21/2020 3 8     WBC 02/21/2020 9 67     RBC 02/21/2020 5 07     Hemoglobin 02/21/2020 15 6     Hematocrit 02/21/2020 44 7     MCV 02/21/2020 88     MCH 02/21/2020 30 8     MCHC 02/21/2020 34 9     RDW 02/21/2020 13 2     MPV 02/21/2020 9 4     Platelets 91/19/5670 233     nRBC 02/21/2020 0     Neutrophils Relative 02/21/2020 63     Immat GRANS % 02/21/2020 1     Lymphocytes Relative 02/21/2020 20     Monocytes Relative 02/21/2020 10     Eosinophils Relative 02/21/2020 5     Basophils Relative 02/21/2020 1     Neutrophils Absolute 02/21/2020 6 26     Immature Grans Absolute 02/21/2020 0 05     Lymphocytes Absolute 02/21/2020 1 90     Monocytes Absolute 02/21/2020 0 96     Eosinophils Absolute 02/21/2020 0 45     Basophils Absolute 02/21/2020 0 05      Imaging Studies: I have personally reviewed pertinent reports  EKG, Pathology, and Other Studies: I have personally reviewed pertinent reports  Culture  Lab Results   Component Value Date    BLOODCX Received in Microbiology Lab  Culture in Progress  02/20/2020    BLOODCX Received in Microbiology Lab  Culture in Progress   02/20/2020     No results found for: WOUNDCULT  No results found for: URINECX  No results found for: SPUTUMCULTUR    Principal Problem:    Cellulitis of left foot  Active Problems:    Tobacco use

## 2020-02-21 NOTE — ASSESSMENT & PLAN NOTE
Patient with left foot cellulitis/tenia pedis  Was on Bactrim since Tuesday with worsening cellulitis  IV antibiotics with Ancef and Cleocin  Elevate left lower extremity  Follow-up culture results  ID input appreciated  DC IV fluids  Monitor renal function in am  Continue miconazole for Tinea pedis  As per infectious disease if patient is not improved will see need repeat imaging

## 2020-02-21 NOTE — UTILIZATION REVIEW
Initial Clinical Review    Admission: Date/Time/Statement: Admission Orders (From admission, onward)     Ordered        02/20/20 1447  Inpatient Admission (expected length of stay for this patient Order details is greater than two midnights)  Once                   Orders Placed This Encounter   Procedures    Inpatient Admission (expected length of stay for this patient Order details is greater than two midnights)     Standing Status:   Standing     Number of Occurrences:   1     Order Specific Question:   Admitting Physician     Answer:   Fanny Young [[id number]]     Order Specific Question:   Level of Care     Answer:   Med Surg [[id number]]     Order Specific Question:   Estimated length of stay     Answer:   More than 2 Midnights     Order Specific Question:   Certification     Answer:   I certify that inpatient services are medically necessary for this patient for a duration of greater than two midnights  See H&P and MD Progress Notes for additional information about the patient's course of treatment  ED Arrival Information     Expected Arrival Acuity Means of Arrival Escorted By Service Admission Type    - 2/20/2020 12:44 Urgent Walk-In Self General Medicine Urgent    Arrival Complaint    infection foot        Chief Complaint   Patient presents with    Foot Swelling     Pt with left foot swelling and pain since friday 2/14/20, denies injury  [facility] and is here today for continued swelling and pain  Has been on naproxyn and bactrim since 2/18/20  Assessment/Plan: This is a 30year old male form home to ED admitted inpatient due to cellulitis of left foot  History of tinea pedis  Presented due to worsening pain, swelling, warmth and erythema of left foot starting about 6 days ago, at base of left 4th toe and now spreading to dorsum of foot and other toes  Has been scratching area  Started bactrim 2 days ago (2/18)  but has worsened    On exam has swelling, warmth, erythema and tenderness of dorsum of left foot and toes  Maceration in 3rd, 4th and 5th web spaces  Wbc 10 14  CRP 70 4  Sed rate 12  Xray left foot with soft tissue swelling dorsum of foot  IV antibiotics in progress  ID consulted  On 2/21/2020 per ID -  Patient with left cellulitis/tinea pedis  His underlying tinea pedis is the etiology for his current cellulitis since he was scratching btw his toes  Most likely due to Strep or Staph species  Continue antibiotics and redness is slightly decreased  If does not further may need image to make sure no abscesss at left 4th toe     ED Triage Vitals [02/20/20 1251]   Temperature Pulse Respirations Blood Pressure SpO2   98 2 °F (36 8 °C) 90 16 145/78 99 %      Temp Source Heart Rate Source Patient Position - Orthostatic VS BP Location FiO2 (%)   Temporal Monitor Sitting Right arm --      Pain Score       Worst Possible Pain        Wt Readings from Last 1 Encounters:   02/21/20 99 6 kg (219 lb 8 oz)     Additional Vital Signs:   02/21/20 08:09:11  98 1 °F (36 7 °C)  83  20  128/90  103  95 %       02/21/20 00:02:32  98 °F (36 7 °C)  85  16  109/69  82  96 %       02/20/20 15:52:34  97 9 °F (36 6 °C)  76    137/84  102  97 %           Pertinent Labs/Diagnostic Test Results:   2/20/2020 Xray left foot - No acute osseous abnormality  Mild soft tissue swelling in the dorsum of foot  Results from last 7 days   Lab Units 02/21/20  0545 02/20/20  1326   WBC Thousand/uL 9 67 10 14   HEMOGLOBIN g/dL 15 6 15 9   HEMATOCRIT % 44 7 46 1   PLATELETS Thousands/uL 233 229   NEUTROS ABS Thousands/µL 6 26 7 34         Results from last 7 days   Lab Units 02/21/20  0545 02/20/20  1326   SODIUM mmol/L 139 138   POTASSIUM mmol/L 4 3 3 8   CHLORIDE mmol/L 104 102   CO2 mmol/L 28 30   ANION GAP mmol/L 7 6   BUN mg/dL 10 10   CREATININE mg/dL 0 99 1 00   EGFR ml/min/1 73sq m 101 100   CALCIUM mg/dL 9 0 9 1   MAGNESIUM mg/dL 2 3  --    PHOSPHORUS mg/dL 3 8  --      Results from last 7 days   Lab Units 02/21/20  0545 02/20/20  1326   AST U/L 16 20   ALT U/L 57 64   ALK PHOS U/L 51 55   TOTAL PROTEIN g/dL 7 8 8 3*   ALBUMIN g/dL 3 6 4 0   TOTAL BILIRUBIN mg/dL 0 60 0 60     Results from last 7 days   Lab Units 02/21/20  0545 02/20/20  1326   GLUCOSE RANDOM mg/dL 100 108     Results from last 7 days   Lab Units 02/20/20  1326   HEMOGLOBIN A1C % 5 1   EAG mg/dl 100     Results from last 7 days   Lab Units 02/20/20  1326   CRP mg/L 70 4*   SED RATE mm/hour 12*     Results from last 7 days   Lab Units 02/20/20  1750   BLOOD CULTURE  Received in Microbiology Lab  Culture in Progress  Received in Microbiology Lab  Culture in Progress  ED Treatment:  Blood cultures   Medication Administration from 02/20/2020 1244 to 02/20/2020 1527     None        Past Medical History:   Diagnosis Date    Arthritis      Present on Admission:  **None**      Admitting Diagnosis: Foot swelling [M79 89]  Cellulitis of left foot [L03 116]  Age/Sex: 30 y o  male  Admission Orders: 2/20/2020 1447 inpatient   Scheduled Medications:  Medications:  cefazolin 1,000 mg Intravenous Q8H   clindamycin 600 mg Intravenous Q8H   enoxaparin 40 mg Subcutaneous Daily   miconazole  Topical BID   nicotine 1 patch Transdermal Daily     Continuous IV Infusions:  sodium chloride 100 mL/hr Intravenous Continuous     PRN Meds:  Acetaminophen - used x 1  650 mg Oral Q6H PRN   HYDROmorphone 0 5 mg Intravenous Q4H PRN   morphine injection - used x 1 (8531) 4 mg Intravenous Q4H PRN   morphine injection -  Used x 1 (7654) 2 mg Intravenous Q4H PRN   ondansetron 4 mg Intravenous Q6H PRN       IP CONSULT TO INFECTIOUS DISEASES    Network Utilization Review Department  [email or web address]  ATTENTION: Please call with any questions or concerns to [phone] and carefully listen to the prompts so that you are directed to the right person   All voicemails are confidential   Primo Noel all requests for admission clinical reviews, approved or denied determinations and any other requests to dedicated fax number below belonging to the campus where the patient is receiving treatment   List of dedicated fax numbers for the Facilities:  [address] [place] [address] DENIALS (Administrative/Medical Necessity) [phone]   [address]  (Maternity/NICU/Pediatrics) [phone] [address] [facility] [phone]   Raul Catry [phone]   [place] [place] [phone]   Ananda Espinoza [phone]   [address] [place] [address] [phone]   [facility]  [phone]   [address] [facility] [address]  [address] [address] [facility] [phone]

## 2020-02-21 NOTE — PHYSICAL THERAPY NOTE
PT screen  ORder rec'd chart reviewed met with pt who states he is hopping to avoid wb on sore L foot  Suggested RW or crutches,pt declines at presents  Will revisit closer to d/c

## 2020-02-21 NOTE — PLAN OF CARE
Problem: PAIN - ADULT  Goal: Verbalizes/displays adequate comfort level or baseline comfort level  Description  Interventions:  - Encourage patient to monitor pain and request assistance  - Assess pain using appropriate pain scale  - Administer analgesics based on type and severity of pain and evaluate response  - Implement non-pharmacological measures as appropriate and evaluate response  - Consider cultural and social influences on pain and pain management  - Notify physician/advanced practitioner if interventions unsuccessful or patient reports new pain  Outcome: Progressing     Problem: INFECTION - ADULT  Goal: Absence or prevention of progression during hospitalization  Description  INTERVENTIONS:  - Assess and monitor for signs and symptoms of infection  - Monitor lab/diagnostic results  - Monitor all insertion sites, i e  indwelling lines, tubes, and drains  - Monitor endotracheal if appropriate and nasal secretions for changes in amount and color  - [place] appropriate cooling/warming therapies per order  - Administer medications as ordered  - Instruct and encourage patient and family to use good hand hygiene technique  - Identify and instruct in appropriate isolation precautions for identified infection/condition  Outcome: Progressing  Goal: Absence of fever/infection during neutropenic period  Description  INTERVENTIONS:  - Monitor WBC    Outcome: Progressing

## 2020-02-21 NOTE — OCCUPATIONAL THERAPY NOTE
Occupational Therapy Cancellation Note     Patient Name: Phuc Kay  DAPFX'T Date: 2/21/2020  Problem List  Principal Problem:    Cellulitis of left foot  Active Problems:    Tobacco use        OT orders received and chart reviewed  Pt reports that he has been up in bathroom with difficulty  Pt endorsing significant LLE pain at this time and prefers to hold therapy services until pain is better managed  Will follow pt's progress            Ramon Orellana, OTR/L

## 2020-02-21 NOTE — CONSULTS
Consult - Podiatry   Caprice Ceballos 30 y o  male MRN: [id number]  Unit/Bed#: -01 Encounter: [id number]    Assessment/Plan     Abscess left 4th toe  Cellulitis left foot/Leg   -will require OR incision and drainage   -case request put in for tomorrow 9AM   -consent reviewed in detail signed, NPO 12 midnight   -  All risks benefits alternatives and possible complications reviewed in detail regarding the anticipated surgical procedure  -MRI left foot to evaluate extent of abscess completed  Radiology final reading of MRI pending, does not appear to have any bone infection, extensive abscess is noted coursing from 4th middle phalanx to the 4th MPJ  History of Present Illness     HPI:  Caprice Ceballos is a 30 y o  male who presents with red hot swollen left forefoot  Patient was admitted to [facility] 1626 and empirically started on intravenous antibiotics  Patient relates having a problem off and on since last November with the problem improving before [place]  Patient relates he was given steroids and antibiotics by a physician in [place]  Relates a history of athlete's foot which has caused him to scratch in between his toes which led to his infection  Left 4th toe started to swell last week  Was seen in urgent care and given prescription for Bactrim DS  Patient's says his foot continued to get more red and swollen and painful, which led to his admission yesterday  Podiatry consult requested to evaluate the possibility of a deep infection/abscess  Patient seen at bedside with Dr Angel Palomino  Inpatient consult to Podiatry  Consult performed by: Pat Sims DPM  Consult ordered by: Sandra Miguel MD        Review of Systems   Constitutional: Negative  HENT: Negative  Eyes: Negative  Respiratory: Negative  Cardiovascular:  negative    Gastrointestinal: Negative      Musculoskeletal:  Painful left foot   Skin:  Hot red left foot   Neurological:  negative        Historical Information   Past Medical History:   Diagnosis Date    Arthritis      History reviewed  No pertinent surgical history  Social History   Social History     Substance and Sexual Activity   Alcohol Use Yes    Alcohol/week: 12 0 standard drinks    Types: 12 Cans of beer per week    Frequency: 2-4 times a month    Drinks per session: 10 or more    Binge frequency: Less than monthly    Comment: 12 pack per occasion     Social History     Substance and Sexual Activity   Drug Use No     Social History     Tobacco Use   Smoking Status Current Every Day Smoker    Packs/day: 2 00    Types: Cigarettes   Smokeless Tobacco Current User     Family History: History reviewed  No pertinent family history      Meds/Allergies   Medications Prior to Admission   Medication    naproxen (NAPROSYN) 500 mg tablet    sulfamethoxazole-trimethoprim (BACTRIM DS) 800-160 mg per tablet     No Known Allergies    Objective   First Vitals:   Blood Pressure: 145/78 (02/20/20 1251)  Pulse: 90 (02/20/20 1251)  Temperature: 98 2 °F (36 8 °C) (02/20/20 1251)  Temp Source: Temporal (02/20/20 1251)  Respirations: 16 (02/20/20 1251)  Height: 5' 6" (167 6 cm) (02/20/20 1251)  Weight - Scale: 104 kg (230 lb) (02/20/20 1251)  SpO2: 99 % (02/20/20 1251)    Current Vitals:   Blood Pressure: 129/87 (02/21/20 1554)  Pulse: 92 (02/21/20 1554)  Temperature: 98 8 °F (37 1 °C) (02/21/20 1554)  Temp Source: Temporal (02/20/20 1251)  Respirations: 20 (02/21/20 0809)  Height: 5' 6" (167 6 cm) (02/20/20 1251)  Weight - Scale: 99 6 kg (219 lb 8 oz) (02/21/20 0545)  SpO2: 98 % (02/21/20 1554)        /87   Pulse 92   Temp 98 8 °F (37 1 °C)   Resp 20   Ht 5' 6" (1 676 m)   Wt 99 6 kg (219 lb 8 oz)   SpO2 98%   BMI 35 43 kg/m²     General Appearance:    Alert, cooperative, no distress   Head:    Normocephalic, without obvious abnormality, atraumatic   Eyes:    PERRL, conjunctiva/corneas clear, EOM's intact            Nose:   Moist mucous membranes, no drainage or sinus tenderness   Throat:   No tenderness, no exudates   Neck:   Supple, symmetrical, trachea midline, no JVD   Back:     Symmetric, no CVA tenderness   Lungs:     Respirations unlabored   Chest wall:    No tenderness or deformity   Heart:    Rate and rhythm noted on last vitals  Abdomen:     Soft, non-tender, bowel sounds active all four quadrants,     no masses, no organomegaly       Lower Ext/Ortho:  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, painful swollen left foot  Neuro/Vasc: CNII-XII intact  Normal strength  Sensation and reflexes:   Grossly intact  Pulses: Right: DP  2/4, PT  2/4, Left: DP  2/4, PT  2/4  Capillary Fillin Sec, Edema:  Edematous left forefoot & leg    Skin: Texture, Tone and Turgor: WNL, Digital Hair:  present  Atrophic Changes:  none   Lesions:  none  Nail Pathology:  Normal without dystrophic changes  Ulcers/Wounds: Maceration with partial depth breakdown noted along the lateral aspect of the Left 4th toe within the 4th interspace  No drainage noted  Significant pain present with evaluation which limited the exam   Fluctuance noted about the 4th toe proximally, significant Calor and erythema present of the forefoot starting distal lateral and coursing on the dorsal the foot up onto the leg                       Lab Results:   CBC w/diff  Results from last 7 days   Lab Units 20  0545   WBC Thousand/uL 9 67   HEMOGLOBIN g/dL 15 6   HEMATOCRIT % 44 7   PLATELETS Thousands/uL 233   NEUTROS PCT % 63   LYMPHS PCT % 20   MONOS PCT % 10   EOS PCT % 5     BMP  Results from last 7 days   Lab Units 20  0545   POTASSIUM mmol/L 4 3   CHLORIDE mmol/L 104   CO2 mmol/L 28   BUN mg/dL 10   CREATININE mg/dL 0 99   CALCIUM mg/dL 9 0     CMP  Results from last 7 days   Lab Units 20  0545   POTASSIUM mmol/L 4 3   CHLORIDE mmol/L 104   CO2 mmol/L 28   BUN mg/dL 10   CREATININE mg/dL 0 99   CALCIUM mg/dL 9 0   ALK PHOS U/L 51   ALT U/L 57   AST U/L 16 @Culture@  Lab Results   Component Value Date    BLOODCX Received in Microbiology Lab  Culture in Progress  02/20/2020    BLOODCX Received in Microbiology Lab  Culture in Progress  02/20/2020         Imaging: I have personally reviewed pertinent films in PACS      EKG, Pathology, and Other Studies: I have personally reviewed pertinent reports  Code Status: Level 1 - Full Code    Please Note: Dictation voice to text software was used in the creation of this document         Jose Luis Mcnair DPM

## 2020-02-22 ENCOUNTER — ANESTHESIA (INPATIENT)
Dept: PERIOP | Facility: HOSPITAL | Age: 30
DRG: 603 | End: 2020-02-22
Payer: COMMERCIAL

## 2020-02-22 ENCOUNTER — ANESTHESIA EVENT (INPATIENT)
Dept: PERIOP | Facility: HOSPITAL | Age: 30
DRG: 603 | End: 2020-02-22
Payer: COMMERCIAL

## 2020-02-22 LAB
ANION GAP SERPL CALCULATED.3IONS-SCNC: 6 MMOL/L (ref 4–13)
BASOPHILS # BLD AUTO: 0.05 THOUSANDS/ΜL (ref 0–0.1)
BASOPHILS NFR BLD AUTO: 1 % (ref 0–1)
BUN SERPL-MCNC: 9 MG/DL (ref 5–25)
CALCIUM SERPL-MCNC: 8.8 MG/DL (ref 8.3–10.1)
CHLORIDE SERPL-SCNC: 102 MMOL/L (ref 100–108)
CO2 SERPL-SCNC: 30 MMOL/L (ref 21–32)
CREAT SERPL-MCNC: 0.87 MG/DL (ref 0.6–1.3)
EOSINOPHIL # BLD AUTO: 0.36 THOUSAND/ΜL (ref 0–0.61)
EOSINOPHIL NFR BLD AUTO: 4 % (ref 0–6)
ERYTHROCYTE [DISTWIDTH] IN BLOOD BY AUTOMATED COUNT: 13.2 % (ref 11.6–15.1)
GFR SERPL CREATININE-BSD FRML MDRD: 115 ML/MIN/1.73SQ M
GLUCOSE SERPL-MCNC: 101 MG/DL (ref 65–140)
HCT VFR BLD AUTO: 43.2 % (ref 36.5–49.3)
HGB BLD-MCNC: 15.1 G/DL (ref 12–17)
IMM GRANULOCYTES # BLD AUTO: 0.04 THOUSAND/UL (ref 0–0.2)
IMM GRANULOCYTES NFR BLD AUTO: 1 % (ref 0–2)
LYMPHOCYTES # BLD AUTO: 1.77 THOUSANDS/ΜL (ref 0.6–4.47)
LYMPHOCYTES NFR BLD AUTO: 20 % (ref 14–44)
MCH RBC QN AUTO: 30.5 PG (ref 26.8–34.3)
MCHC RBC AUTO-ENTMCNC: 35 G/DL (ref 31.4–37.4)
MCV RBC AUTO: 87 FL (ref 82–98)
MONOCYTES # BLD AUTO: 1.09 THOUSAND/ΜL (ref 0.17–1.22)
MONOCYTES NFR BLD AUTO: 12 % (ref 4–12)
NEUTROPHILS # BLD AUTO: 5.49 THOUSANDS/ΜL (ref 1.85–7.62)
NEUTS SEG NFR BLD AUTO: 62 % (ref 43–75)
NRBC BLD AUTO-RTO: 0 /100 WBCS
PLATELET # BLD AUTO: 239 THOUSANDS/UL (ref 149–390)
PMV BLD AUTO: 9.2 FL (ref 8.9–12.7)
POTASSIUM SERPL-SCNC: 4.2 MMOL/L (ref 3.5–5.3)
RBC # BLD AUTO: 4.95 MILLION/UL (ref 3.88–5.62)
SODIUM SERPL-SCNC: 138 MMOL/L (ref 136–145)
WBC # BLD AUTO: 8.8 THOUSAND/UL (ref 4.31–10.16)

## 2020-02-22 PROCEDURE — 99232 SBSQ HOSP IP/OBS MODERATE 35: CPT | Performed by: INTERNAL MEDICINE

## 2020-02-22 PROCEDURE — 87147 CULTURE TYPE IMMUNOLOGIC: CPT | Performed by: PODIATRIST

## 2020-02-22 PROCEDURE — 87075 CULTR BACTERIA EXCEPT BLOOD: CPT | Performed by: PODIATRIST

## 2020-02-22 PROCEDURE — 87070 CULTURE OTHR SPECIMN AEROBIC: CPT | Performed by: PODIATRIST

## 2020-02-22 PROCEDURE — 80048 BASIC METABOLIC PNL TOTAL CA: CPT | Performed by: INTERNAL MEDICINE

## 2020-02-22 PROCEDURE — 0J9R3ZZ DRAINAGE OF LEFT FOOT SUBCUTANEOUS TISSUE AND FASCIA, PERCUTANEOUS APPROACH: ICD-10-PCS | Performed by: PODIATRIST

## 2020-02-22 PROCEDURE — 85025 COMPLETE CBC W/AUTO DIFF WBC: CPT | Performed by: INTERNAL MEDICINE

## 2020-02-22 PROCEDURE — 87186 SC STD MICRODIL/AGAR DIL: CPT | Performed by: PODIATRIST

## 2020-02-22 PROCEDURE — 87205 SMEAR GRAM STAIN: CPT | Performed by: PODIATRIST

## 2020-02-22 RX ORDER — MIDAZOLAM HYDROCHLORIDE 2 MG/2ML
INJECTION, SOLUTION INTRAMUSCULAR; INTRAVENOUS AS NEEDED
Status: DISCONTINUED | OUTPATIENT
Start: 2020-02-22 | End: 2020-02-22 | Stop reason: SURG

## 2020-02-22 RX ORDER — ONDANSETRON 2 MG/ML
4 INJECTION INTRAMUSCULAR; INTRAVENOUS ONCE AS NEEDED
Status: DISCONTINUED | OUTPATIENT
Start: 2020-02-22 | End: 2020-02-22

## 2020-02-22 RX ORDER — FENTANYL CITRATE/PF 50 MCG/ML
25 SYRINGE (ML) INJECTION
Status: DISCONTINUED | OUTPATIENT
Start: 2020-02-22 | End: 2020-02-22

## 2020-02-22 RX ORDER — PROPOFOL 10 MG/ML
INJECTION, EMULSION INTRAVENOUS AS NEEDED
Status: DISCONTINUED | OUTPATIENT
Start: 2020-02-22 | End: 2020-02-22 | Stop reason: SURG

## 2020-02-22 RX ORDER — OXYCODONE HYDROCHLORIDE AND ACETAMINOPHEN 5; 325 MG/1; MG/1
1 TABLET ORAL EVERY 6 HOURS PRN
Status: DISCONTINUED | OUTPATIENT
Start: 2020-02-22 | End: 2020-02-24

## 2020-02-22 RX ORDER — PROPOFOL 10 MG/ML
INJECTION, EMULSION INTRAVENOUS CONTINUOUS PRN
Status: DISCONTINUED | OUTPATIENT
Start: 2020-02-22 | End: 2020-02-22 | Stop reason: SURG

## 2020-02-22 RX ORDER — MORPHINE SULFATE 4 MG/ML
4 INJECTION, SOLUTION INTRAMUSCULAR; INTRAVENOUS EVERY 4 HOURS PRN
Status: DISCONTINUED | OUTPATIENT
Start: 2020-02-22 | End: 2020-02-24

## 2020-02-22 RX ORDER — FENTANYL CITRATE 50 UG/ML
INJECTION, SOLUTION INTRAMUSCULAR; INTRAVENOUS AS NEEDED
Status: DISCONTINUED | OUTPATIENT
Start: 2020-02-22 | End: 2020-02-22 | Stop reason: SURG

## 2020-02-22 RX ORDER — SODIUM CHLORIDE, SODIUM LACTATE, POTASSIUM CHLORIDE, CALCIUM CHLORIDE 600; 310; 30; 20 MG/100ML; MG/100ML; MG/100ML; MG/100ML
INJECTION, SOLUTION INTRAVENOUS CONTINUOUS PRN
Status: DISCONTINUED | OUTPATIENT
Start: 2020-02-22 | End: 2020-02-22 | Stop reason: SURG

## 2020-02-22 RX ADMIN — PROPOFOL 50 MG: 10 INJECTION, EMULSION INTRAVENOUS at 09:17

## 2020-02-22 RX ADMIN — CLINDAMYCIN IN 5 PERCENT DEXTROSE 600 MG: 12 INJECTION, SOLUTION INTRAVENOUS at 09:21

## 2020-02-22 RX ADMIN — CEFAZOLIN SODIUM 1000 MG: 1 SOLUTION INTRAVENOUS at 09:18

## 2020-02-22 RX ADMIN — CEFAZOLIN SODIUM 1000 MG: 1 SOLUTION INTRAVENOUS at 15:52

## 2020-02-22 RX ADMIN — SODIUM CHLORIDE, SODIUM LACTATE, POTASSIUM CHLORIDE, AND CALCIUM CHLORIDE: .6; .31; .03; .02 INJECTION, SOLUTION INTRAVENOUS at 08:29

## 2020-02-22 RX ADMIN — PROPOFOL 50 MG: 10 INJECTION, EMULSION INTRAVENOUS at 09:32

## 2020-02-22 RX ADMIN — MORPHINE SULFATE 4 MG: 4 INJECTION INTRAVENOUS at 18:46

## 2020-02-22 RX ADMIN — MORPHINE SULFATE 4 MG: 4 INJECTION INTRAVENOUS at 22:54

## 2020-02-22 RX ADMIN — CLINDAMYCIN IN 5 PERCENT DEXTROSE 600 MG: 12 INJECTION, SOLUTION INTRAVENOUS at 17:54

## 2020-02-22 RX ADMIN — PROPOFOL 50 MCG/KG/MIN: 10 INJECTION, EMULSION INTRAVENOUS at 09:22

## 2020-02-22 RX ADMIN — ENOXAPARIN SODIUM 40 MG: 40 INJECTION SUBCUTANEOUS at 11:22

## 2020-02-22 RX ADMIN — FENTANYL CITRATE 100 MCG: 50 INJECTION, SOLUTION INTRAMUSCULAR; INTRAVENOUS at 09:11

## 2020-02-22 RX ADMIN — MORPHINE SULFATE 2 MG: 2 INJECTION, SOLUTION INTRAMUSCULAR; INTRAVENOUS at 15:51

## 2020-02-22 RX ADMIN — OXYCODONE HYDROCHLORIDE AND ACETAMINOPHEN 1 TABLET: 5; 325 TABLET ORAL at 14:16

## 2020-02-22 RX ADMIN — CEFAZOLIN SODIUM 1000 MG: 1 SOLUTION INTRAVENOUS at 23:15

## 2020-02-22 RX ADMIN — MORPHINE SULFATE 2 MG: 2 INJECTION, SOLUTION INTRAMUSCULAR; INTRAVENOUS at 05:21

## 2020-02-22 RX ADMIN — CLINDAMYCIN IN 5 PERCENT DEXTROSE 600 MG: 12 INJECTION, SOLUTION INTRAVENOUS at 00:38

## 2020-02-22 RX ADMIN — MIDAZOLAM 1.5 MG: 1 INJECTION INTRAMUSCULAR; INTRAVENOUS at 09:11

## 2020-02-22 RX ADMIN — MIDAZOLAM 0.5 MG: 1 INJECTION INTRAMUSCULAR; INTRAVENOUS at 08:31

## 2020-02-22 NOTE — ANESTHESIA POSTPROCEDURE EVALUATION
Post-Op Assessment Note    CV Status:  Stable  Pain Score: 0    Pain management: adequate     Mental Status:  Alert and awake   Hydration Status:  Euvolemic   PONV Controlled:  Controlled   Airway Patency:  Patent   Post Op Vitals Reviewed: Yes      Staff: Anesthesiologist           BP      Temp 97 9 °F (36 6 °C) (02/22/20 1005)    Pulse     Resp 20 (02/22/20 1005)    SpO2 98 % (02/22/20 1005)

## 2020-02-22 NOTE — ASSESSMENT & PLAN NOTE
MRI foot/forefoot is pending  Podiatry on board  Patient underwent I&D by Podiatry today  Continue IV antibiotics

## 2020-02-22 NOTE — PROGRESS NOTES
Progress Note - Infectious Disease   Kyung Gonzalez 30 y o  male MRN: [id number]  Unit/Bed#: -01 Encounter: [id number]    Assessment/Plan    1  Left cellulitis/Tinea pedis: Pt presented with increasing redness, swelling, and pain of his left foot, especially involving his left 4th toe c/w cellulitis  His underlying tinea pedis is the etiology for his current cellulitis since he was scratching btw his toes  Most likely due to Strep or Staph species  He does not have fever or elevated WBC count  He took Bactrim x 2 days without significant improvement so Strep is probably the etiology for his current presentation      Continue on clindamycin and Ancef  Podiatry plans to take the patient to surgery for incision and drainage of the left lower extremity (Today 2/22)  Follow up on intraoperative deep tissue cultures 2/22  No changes from my standpoint today  Continue with current medical management and antimicrobial therapy   Blood cultures on admission remain negative 2/20      Susana Vaughan DO  Infectious Disease  [facility]   Cell [phone]      Subjective/Objective       Subjective:     Podiatry is taking the patient to surgery for incision and drainage on the left lower extremity  No reported fevers the patient denies being in severe pain    REVIEW OF SYSTEMS  GENERAL/CONSTITUTIONAL: The patient denies fever, fatigue, weakness, weight gain or weight loss    HEAD, EYES, EARS, NOSE AND THROAT: Eyes  The patient denies pain, redness, loss of vision, double or blurred vision, flashing lights or spots, dryness, sore throats, and hoarseness   CARDIOVASCULAR: The patient denies chest pain, irregular heartbeats, sudden changes in heartbeat or palpitation, shortness of breath, difficulty breathing at night, swollen legs RESPIRATORY: The patient denies chronic dry cough, coughing up blood, coughing up mucus and wheezing  GASTROINTESTINAL: The patient denies decreased appetite, nausea, vomiting, vomiting blood or coffee ground material, heartburn  GENITOURINARY: The patient denies difficult urination, pain or burning with urination, blood in the urine, cloudy or smoky urine, frequent need to urinate  MUSCULOSKELETAL: The patient denies arm, buttock, thigh or calf cramps  No joint or muscle pain  No muscle weakness or tenderness  No joint swelling, neck pain, back pain or major orthopedic injuries  SKIN AND BREASTS: The patient denies easy bruising, skin redness, skin rash, hives, sensitivity to sun exposure, tightness, nodules or bumps, hair loss, color changes in the hands or feet with cold, breast lump, breast pain or nipple discharge  NEUROLOGIC: The patient denies headache, dizziness, fainting, muscle spasm, loss of consciousness, sensitivity or pain in the hands and feet or memory loss  PSYCHIATRIC: The patient denies depression with thoughts of suicide, voices in ?? head telling ? ? to do things and has not been seen for psychiatric counseling or treatment  ENDOCRINE: The patient denies intolerance to hot or cold temperature, flushing, fingernail changes, increased thirst, increased salt intake or decreased sexual desire  HEMATOLOGIC/LYMPHATIC: The patient denies anemia, bleeding tendency or clotting tendency  ALLERGIC/IMMUNOLOGIC: The patient denies rhinitis, asthma, skin sensitivity, latex allergies or sensitivity  Objective:     Temp:  [97 7 °F (36 5 °C)-98 8 °F (37 1 °C)] 98 °F (36 7 °C)  HR:  [66-94] 85  Resp:  [15-20] 18  BP: ()/(56-91) 135/82  SpO2:  [96 %-98 %] 97 %  Temp (24hrs), Av 2 °F (36 8 °C), Min:97 7 °F (36 5 °C), Max:98 8 °F (37 1 °C)  Current: Temperature: 98 °F (36 7 °C)    Physical Exam:     GENERAL APPEARANCE: Well developed, well nourished, in no acute distress  SKIN: Inspection of the skin reveals no rashes, ulcerations or petechiae    HEENT: The sclerae were anicteric and conjunctivae were pink and moist  Extraocular movements were intact and pupils were equal, round, and reactive to light with normal accommodation  NECK: Supple and symmetric  There was no thyroid enlargement, and no tenderness, or masses were felt  CHEST: Normal AP diameter and normal contour without any kyphoscoliosis  LUNGS: Auscultation of the lungs revealed normal breath sounds without any other adventitious sounds or rubs  CARDIOVASCULAR: There was a regular rate and rhythm without any murmurs, gallops, rubs  The carotid pulses were normal and 2+ bilaterally without bruits  ABDOMEN: Soft and nontender with normal bowel sounds  No ascites was noted  LYMPH NODES: No lymphadenopathy was appreciated in the neck, axillae or groin  MUSCULOSKELETAL: Gait was normal  There was no tenderness or effusions noted  Muscle strength and tone were normal   EXTREMITIES: Swelling redness and inflammation on the left lower extremity no open lesions noted  NEUROLOGIC: Alert and oriented x 3  Normal affect       Invasive Devices     Peripheral Intravenous Line            Peripheral IV 02/20/20 Right Antecubital 1 day                  Labs:  CBC w/diff  Recent Labs     02/22/20  0624   WBC 8 80   HGB 15 1   HCT 43 2      NEUTOPHILPCT 62   LYMPHOPCT 20   MONOPCT 12   EOSPCT 4     BMP  Recent Labs     02/22/20  0624   K 4 2      CO2 30   BUN 9   CREATININE 0 87   CALCIUM 8 8     CMP  Recent Labs     02/21/20  0545 02/22/20  0624   K 4 3 4 2    102   CO2 28 30   BUN 10 9   CREATININE 0 99 0 87   CALCIUM 9 0 8 8   ALKPHOS 51  --    ALT 57  --    AST 16  --         labrc    Cultures:  Lab Results   Component Value Date    BLOODCX No Growth at 24 hrs  02/20/2020    BLOODCX No Growth at 24 hrs  02/20/2020     No results found for: WOUNDCULT  No results found for: URINECX  No results found for: SPUTUMCULTUR    MED:      Current Facility-Administered Medications:     acetaminophen (TYLENOL) tablet 650 mg, 650 mg, Oral, Q6H PRN, Jian Brewer DPM, 650 mg at 02/20/20 1935    ceFAZolin (ANCEF) IVPB (premix) 1,000 mg, 1,000 mg, Intravenous, Q8H, Darren Done, DPM, Stopped at 02/22/20 0920    clindamycin (CLEOCIN) IVPB (premix) 600 mg, 600 mg, Intravenous, Q8H, Darren Done, DPM, Stopped at 02/22/20 0932    enoxaparin (LOVENOX) subcutaneous injection 40 mg, 40 mg, Subcutaneous, Daily, Darren Done, DPM, 40 mg at 02/22/20 1122    miconazole 2 % cream, , Topical, BID, Darren Done, DPM    morphine injection 2 mg, 2 mg, Intravenous, Q4H PRN, Darren Done, DPM, 2 mg at 02/21/20 2337    morphine injection 2 mg, 2 mg, Intravenous, Q3H PRN, Darren Done, DPM, 2 mg at 02/22/20 0521    nicotine (NICODERM CQ) 21 mg/24 hr TD 24 hr patch 1 patch, 1 patch, Transdermal, Daily, Darren Done, DPM, 1 patch at 02/21/20 0825    ondansetron [facility]) injection 4 mg, 4 mg, Intravenous, Q6H PRN, Darren Done, DPM    Principal Problem:    Cellulitis of left foot  Active Problems:    Tobacco use    Abscess of fourth toe of left foot      Mere Ryan DO

## 2020-02-22 NOTE — OP NOTE
OPERATIVE REPORT - Podiatry  PATIENT NAME: Librado Whitesdie    :  1990  MRN: [id number]  Pt Location:  OR ROOM 01    SURGERY DATE: 2020    Surgeon(s) and Role:     * Son Arango DPM - Primary     * Cecy Isbell DPM - Assisting    Pre-op Diagnosis:  Abscess of fourth toe of left foot [L02 612]    Post-Op Diagnosis Codes:     * Abscess of fourth toe of left foot [L02 612]    Procedure(s) (LRB):  INCISION AND DRAINAGE (I&D) EXTREMITY (Left), deep to fascial layer    Specimen(s):  ID Type Source Tests Collected by Time Destination   A :  Wound Foot, Left ANAEROBIC CULTURE AND GRAM STAIN, WOUND CULTURE Son Arango DPM 2020 [id number]        Estimated Blood Loss:   Minimal    Drains:  * No LDAs found *    Anesthesia Type:   IV Sedation with Anesthesia with 10 ml of 1% Lidocaine and 0 5% Bupivacaine in a 1:1 mixture    Hemostasis:  Surgical dissection  Pneumatic ankle tourniquet placed for 20 minutes    Materials: Iodoform packing approximately 5 feet    Operative Findings:  Approximately 10-15 cc's of infected purulent drainage was noted upon incision  All bone looked white, hard to the touch and viable at this time  No signs of infection tracking proximally through extensor tendon  Complications:   None    Procedure and Technique:     Under mild sedation, the patient was brought into the operating room and placed on the operating room table in the supine position  A pneumatic tourniquet was then placed around the patient's left lower extremity with ample webril padding  A time out was performed to confirm the correct patient, procedure and site with all parties in agreement  Following IV sedation, a local v type block was performed consisting of 10 ml of 1% Lidocaine and 0 5% Bupivacaine in a 1:1 mixture  The foot was then scrubbed, prepped and draped in the usual aseptic manner  An esmarch bandage was utilized to exsangunate the patients foot and the tourniquet was then inflated   The [employer] bandage was removed and the foot was placed on the operating room table  Approximately 2 cm linear incision was performed of the left dorsal 4th digit  shapr and blunt dissection was performed deep through the deep fascial layer to the level of bone, ligating and cauterizing bleeders as needed  Approximately 10-15 cc's of purulent infected drainage was noted at this time  Pain was noted with abnormal amount of movement with incision  20 cc's of 1:1 mixture of 1% lidocaine plain and 0 5 % bupivicaine plain was administered along the plantar foot and dorsal foot yielding little pain relief  Adequate visualization was achieved showing no disturbance of bone, or tracking of infection through tendon sheaths  The surgical incision was irrigated with copious amounts of normal sterile saline  The foot was then cleansed and dried  The incision site was dressed with iodoform packing , 4x4 gauze, and ABD  This was then covered with a Kerlix and an ACE wrap  The tourniquet was deflated and normal hyperemic flush was noted to all digits  The patient tolerated the procedure and anesthesia well and was transported to the PACU with vital signs stable  As with many limb salvage procedures, we contemplate the possibility of performing further stages to this procedure  Procedures may include debridements, delayed closure, plastic surgery techniques, or more proximal amputations  This procedure may be considered part of a multi-staged limb salvage treatment plan  Dr Kayla Boyle was present during the entire procedure and participated in all key aspects  SIGNATURE: Loy Doe DPM  DATE: February 22, 2020  TIME: 10:13 AM      Portions of the record may have been created with voice recognition software  Occasional wrong word or "sound a like" substitutions may have occurred due to the inherent limitations of voice recognition software   Read the chart carefully and recognize, using context, where substitutions have occurred

## 2020-02-22 NOTE — PLAN OF CARE
Problem: PAIN - ADULT  Goal: Verbalizes/displays adequate comfort level or baseline comfort level  Description  Interventions:  - Encourage patient to monitor pain and request assistance  - Assess pain using appropriate pain scale  - Administer analgesics based on type and severity of pain and evaluate response  - Implement non-pharmacological measures as appropriate and evaluate response  - Consider cultural and social influences on pain and pain management  - Notify physician/advanced practitioner if interventions unsuccessful or patient reports new pain  Outcome: Progressing     Problem: INFECTION - ADULT  Goal: Absence or prevention of progression during hospitalization  Description  INTERVENTIONS:  - Assess and monitor for signs and symptoms of infection  - Monitor lab/diagnostic results  - Monitor all insertion sites, i e  indwelling lines, tubes, and drains  - Monitor endotracheal if appropriate and nasal secretions for changes in amount and color  - [place] appropriate cooling/warming therapies per order  - Administer medications as ordered  - Instruct and encourage patient and family to use good hand hygiene technique  - Identify and instruct in appropriate isolation precautions for identified infection/condition  Outcome: Progressing  Goal: Absence of fever/infection during neutropenic period  Description  INTERVENTIONS:  - Monitor WBC    Outcome: Progressing     Problem: Potential for Falls  Goal: Patient will remain free of falls  Description  INTERVENTIONS:  - Assess patient frequently for physical needs  -  Identify cognitive and physical deficits and behaviors that affect risk of falls    -  [place] fall precautions as indicated by assessment   - Educate patient/family on patient safety including physical limitations  - Instruct patient to call for assistance with activity based on assessment  - Modify environment to reduce risk of injury  - Consider OT/PT consult to assist with strengthening/mobility  Outcome: Progressing

## 2020-02-22 NOTE — ANESTHESIA PREPROCEDURE EVALUATION
Review of Systems/Medical History          Cardiovascular   Pulmonary  Smoker ,        GI/Hepatic            Endo/Other     GYN       Hematology   Musculoskeletal    Comment: BMI 36 Arthritis     Neurology   Psychology           Physical Exam    Airway    Mallampati score: II         Dental   No notable dental hx     Cardiovascular  Rhythm: regular, Rate: normal, Cardiovascular exam normal    Pulmonary  Pulmonary exam normal Breath sounds clear to auscultation,     Other Findings        Anesthesia Plan  ASA Score- 2     Anesthesia Type- IV sedation with anesthesia with ASA Monitors  Additional Monitors:   Airway Plan:         Plan Factors-    Induction- intravenous  Postoperative Plan- Plan for postoperative opioid use  Informed Consent- Anesthetic plan and risks discussed with patient

## 2020-02-22 NOTE — PROGRESS NOTES
Progress Note - Kenji Martins 1990, 30 y o  male MRN: [id number]    Unit/Bed#: -01 Encounter: [id number]    Primary Care Provider: No primary care provider on file  Date and time admitted to hospital: 2/20/2020 12:48 PM        Abscess of fourth toe of left foot  Assessment & Plan  MRI foot/forefoot is pending  Podiatry on board  Patient underwent I&D by Podiatry today  Continue IV antibiotics    Tobacco use  Assessment & Plan  Smoking cessation counseling given  On nicotine patch    * Cellulitis of left foot  Assessment & Plan  Patient with left foot cellulitis-possible abscess/tenia pedis  Was on Bactrim since Tuesday with worsening cellulitis  IV antibiotics with Ancef and Cleocin  Elevate left lower extremity  Follow-up culture results  Podiatry and ID on board  Monitor renal function in am  Continue miconazole for Tinea pedis  See above  Labs & Imaging: I have personally reviewed pertinent reports  VTE Pharmacologic Prophylaxis: Reason for no pharmacologic prophylaxis Going to OR  VTE Mechanical Prophylaxis: sequential compression device    Code Status:   Level 1 - Full Code    Patient Centered Rounds: I have performed bedside rounds with nursing staff today  Discussions with Specialists or Other Care Team Provider: Podiatry    Education and Discussions with Family / Patient:     Current Length of Stay: 2 day(s)    Current Patient Status: Inpatient   Certification Statement: The patient will continue to require additional inpatient hospital stay due to see my assessment and plan  Subjective:   Patient is seen and examined at bedside  Underwent I and D of the left foot 4th toe abscess  States his pain is improved  Afebrile  No other complaint  All other ROS are negative  Objective:    Vitals: Blood pressure 129/85, pulse 94, temperature 97 8 °F (36 6 °C), resp  rate 18, height 5' 6" (1 676 m), weight 100 kg (221 lb 6 4 oz), SpO2 97 %  ,Body mass index is 35 73 kg/m²    SPO2 RA Rest      ED to Hosp-Admission (Current) from 2/20/2020 in [address] Med [facility]   SpO2  97 %   SpO2 Activity  At Rest   O2 Device  None (Room air)   O2 Flow Rate          I&O:     Intake/Output Summary (Last 24 hours) at 2/22/2020 0713  Last data filed at 2/21/2020 1801  Gross per 24 hour   Intake 1440 ml   Output    Net 1440 ml       Physical Exam:    General- Alert, lying comfortably in bed  Not in any acute distress  HEENT- FELISA, EOM intact  Neck- Supple, No JVD  CVS- regular, S1 and S2 normal   Chest- Bilateral Air entry, No rhochi, crackles or wheezing present  Abdomen- soft, nontender, not distended, no guarding or rigidity, BS+  Extremities-  No pedal edema, No calf tenderness  Left foot in a dressing  CNS-   Alert, awake and orientedx3  No focal deficits present  Invasive Devices     Peripheral Intravenous Line            Peripheral IV 02/20/20 Right Antecubital 1 day                      Social History  reviewed  History reviewed  No pertinent family history   reviewed    Meds:  Current Facility-Administered Medications   Medication Dose Route Frequency Provider Last Rate Last Dose    acetaminophen (TYLENOL) tablet 650 mg  650 mg Oral Q6H PRN Jerry Burr MD   650 mg at 02/20/20 1935    bacitracin 100,000 Units, neomycin-polymyxin B (NEOSPORIN-) 1 mL in sodium chloride 0 9 % 1,000 mL irrigation bag   Irrigation Once Graham Gonzalez DPM        ceFAZolin (ANCEF) IVPB (premix) 1,000 mg  1,000 mg Intravenous Q8H Jerry Burr  mL/hr at 02/21/20 2336 1,000 mg at 02/21/20 2336    clindamycin (CLEOCIN) IVPB (premix) 600 mg  600 mg Intravenous Q8H Mirlande Soares  mL/hr at 02/22/20 0038 600 mg at 02/22/20 0038    miconazole 2 % cream   Topical BID Jerry Burr MD        morphine injection 2 mg  2 mg Intravenous Q4H PRN Tanya Gosselin, PA-C   2 mg at 02/21/20 2337    morphine injection 2 mg  2 mg Intravenous Q3H PRN Jerry Burr MD   2 mg at 02/22/20 0521    nicotine (NICODERM CQ) 21 mg/24 hr TD 24 hr patch 1 patch  1 patch Transdermal Daily Owen Rosa MD   1 patch at 02/21/20 0825    ondansetron (ZOFRAN) injection 4 mg  4 mg Intravenous Q6H PRN Owen Rosa MD          Medications Prior to Admission   Medication    naproxen (NAPROSYN) 500 mg tablet    sulfamethoxazole-trimethoprim (BACTRIM DS) 800-160 mg per tablet       Labs:  Results from last 7 days   Lab Units 02/22/20  0624 02/21/20  0545 02/20/20  1326   WBC Thousand/uL 8 80 9 67 10 14   HEMOGLOBIN g/dL 15 1 15 6 15 9   HEMATOCRIT % 43 2 44 7 46 1   PLATELETS Thousands/uL 239 233 229   NEUTROS PCT % 62 63 72   LYMPHS PCT % 20 20 18   MONOS PCT % 12 10 7   EOS PCT % 4 5 3     Results from last 7 days   Lab Units 02/22/20  0624 02/21/20  0545 02/20/20  1326   POTASSIUM mmol/L 4 2 4 3 3 8   CHLORIDE mmol/L 102 104 102   CO2 mmol/L 30 28 30   BUN mg/dL 9 10 10   CREATININE mg/dL 0 87 0 99 1 00   CALCIUM mg/dL 8 8 9 0 9 1   ALK PHOS U/L  --  51 55   ALT U/L  --  57 64   AST U/L  --  16 20     No results found for: TROPONINI, CKMB, CKTOTAL      Lab Results   Component Value Date    BLOODCX No Growth at 24 hrs  02/20/2020    BLOODCX No Growth at 24 hrs  02/20/2020         Imaging:  No results found for this or any previous visit  No results found for this or any previous visit      Last 24 Hours Medication List:     Current Facility-Administered Medications:  acetaminophen 650 mg Oral Q6H PRN Owen Rosa MD    bacitracin 100,000 units and neomycin-polymyxin B () 1 mL in sodium chloride 0 9% 1000 mL irrigation bag  Irrigation Once Kimi Bermudez DPM    cefazolin 1,000 mg Intravenous Q8H Owen Rosa MD Last Rate: 1,000 mg (02/21/20 2336)   clindamycin 600 mg Intravenous Q8H Natalie Hicks MD Last Rate: 600 mg (02/22/20 0038)   miconazole  Topical BID Owen Rosa MD    morphine injection 2 mg Intravenous Q4H PRN Osorio Can PA-C    morphine injection 2 mg Intravenous Q3H PRN Tylor Rabago MD    nicotine 1 patch Transdermal Daily Tylor Rabago MD    ondansetron 4 mg Intravenous Q6H PRN Tylor Rabago MD         Today, Patient Was Seen By: Tylor Rabago MD    ** Please Note: Dictation voice to text software may have been used in the creation of this document   **

## 2020-02-22 NOTE — ASSESSMENT & PLAN NOTE
Patient with left foot cellulitis-possible abscess/tenia pedis  Was on Bactrim since Tuesday with worsening cellulitis  IV antibiotics with Ancef and Cleocin  Elevate left lower extremity  Follow-up culture results  Podiatry and ID on board  Monitor renal function in am  Continue miconazole for Tinea pedis  See above

## 2020-02-23 LAB
ANION GAP SERPL CALCULATED.3IONS-SCNC: 9 MMOL/L (ref 4–13)
BASOPHILS # BLD AUTO: 0.06 THOUSANDS/ΜL (ref 0–0.1)
BASOPHILS NFR BLD AUTO: 1 % (ref 0–1)
BUN SERPL-MCNC: 11 MG/DL (ref 5–25)
CALCIUM SERPL-MCNC: 8.9 MG/DL (ref 8.3–10.1)
CHLORIDE SERPL-SCNC: 102 MMOL/L (ref 100–108)
CO2 SERPL-SCNC: 26 MMOL/L (ref 21–32)
CREAT SERPL-MCNC: 0.85 MG/DL (ref 0.6–1.3)
EOSINOPHIL # BLD AUTO: 0.43 THOUSAND/ΜL (ref 0–0.61)
EOSINOPHIL NFR BLD AUTO: 5 % (ref 0–6)
ERYTHROCYTE [DISTWIDTH] IN BLOOD BY AUTOMATED COUNT: 12.9 % (ref 11.6–15.1)
GFR SERPL CREATININE-BSD FRML MDRD: 116 ML/MIN/1.73SQ M
GLUCOSE SERPL-MCNC: 101 MG/DL (ref 65–140)
HCT VFR BLD AUTO: 44.1 % (ref 36.5–49.3)
HGB BLD-MCNC: 15.3 G/DL (ref 12–17)
IMM GRANULOCYTES # BLD AUTO: 0.06 THOUSAND/UL (ref 0–0.2)
IMM GRANULOCYTES NFR BLD AUTO: 1 % (ref 0–2)
LYMPHOCYTES # BLD AUTO: 2.03 THOUSANDS/ΜL (ref 0.6–4.47)
LYMPHOCYTES NFR BLD AUTO: 24 % (ref 14–44)
MCH RBC QN AUTO: 30.6 PG (ref 26.8–34.3)
MCHC RBC AUTO-ENTMCNC: 34.7 G/DL (ref 31.4–37.4)
MCV RBC AUTO: 88 FL (ref 82–98)
MONOCYTES # BLD AUTO: 1.02 THOUSAND/ΜL (ref 0.17–1.22)
MONOCYTES NFR BLD AUTO: 12 % (ref 4–12)
NEUTROPHILS # BLD AUTO: 4.83 THOUSANDS/ΜL (ref 1.85–7.62)
NEUTS SEG NFR BLD AUTO: 57 % (ref 43–75)
NRBC BLD AUTO-RTO: 0 /100 WBCS
PLATELET # BLD AUTO: 244 THOUSANDS/UL (ref 149–390)
PMV BLD AUTO: 9.2 FL (ref 8.9–12.7)
POTASSIUM SERPL-SCNC: 4.3 MMOL/L (ref 3.5–5.3)
RBC # BLD AUTO: 5 MILLION/UL (ref 3.88–5.62)
SODIUM SERPL-SCNC: 137 MMOL/L (ref 136–145)
WBC # BLD AUTO: 8.43 THOUSAND/UL (ref 4.31–10.16)

## 2020-02-23 PROCEDURE — 85025 COMPLETE CBC W/AUTO DIFF WBC: CPT | Performed by: INTERNAL MEDICINE

## 2020-02-23 PROCEDURE — 80048 BASIC METABOLIC PNL TOTAL CA: CPT | Performed by: INTERNAL MEDICINE

## 2020-02-23 PROCEDURE — 97166 OT EVAL MOD COMPLEX 45 MIN: CPT

## 2020-02-23 PROCEDURE — 99232 SBSQ HOSP IP/OBS MODERATE 35: CPT | Performed by: INTERNAL MEDICINE

## 2020-02-23 PROCEDURE — 97163 PT EVAL HIGH COMPLEX 45 MIN: CPT

## 2020-02-23 RX ORDER — OXYCODONE HYDROCHLORIDE AND ACETAMINOPHEN 5; 325 MG/1; MG/1
2 TABLET ORAL ONCE
Status: COMPLETED | OUTPATIENT
Start: 2020-02-23 | End: 2020-02-23

## 2020-02-23 RX ADMIN — CLINDAMYCIN IN 5 PERCENT DEXTROSE 600 MG: 12 INJECTION, SOLUTION INTRAVENOUS at 07:54

## 2020-02-23 RX ADMIN — MORPHINE SULFATE 4 MG: 4 INJECTION INTRAVENOUS at 22:00

## 2020-02-23 RX ADMIN — MORPHINE SULFATE 4 MG: 4 INJECTION INTRAVENOUS at 03:46

## 2020-02-23 RX ADMIN — MORPHINE SULFATE 4 MG: 4 INJECTION INTRAVENOUS at 16:23

## 2020-02-23 RX ADMIN — MORPHINE SULFATE 4 MG: 4 INJECTION INTRAVENOUS at 13:26

## 2020-02-23 RX ADMIN — NICOTINE 1 PATCH: 21 PATCH, EXTENDED RELEASE TRANSDERMAL at 08:42

## 2020-02-23 RX ADMIN — CLINDAMYCIN IN 5 PERCENT DEXTROSE 600 MG: 12 INJECTION, SOLUTION INTRAVENOUS at 00:12

## 2020-02-23 RX ADMIN — CLINDAMYCIN IN 5 PERCENT DEXTROSE 600 MG: 12 INJECTION, SOLUTION INTRAVENOUS at 16:23

## 2020-02-23 RX ADMIN — MORPHINE SULFATE 4 MG: 4 INJECTION INTRAVENOUS at 07:54

## 2020-02-23 RX ADMIN — ENOXAPARIN SODIUM 40 MG: 40 INJECTION SUBCUTANEOUS at 08:42

## 2020-02-23 RX ADMIN — CEFAZOLIN SODIUM 1000 MG: 1 SOLUTION INTRAVENOUS at 16:23

## 2020-02-23 RX ADMIN — CEFAZOLIN SODIUM 1000 MG: 1 SOLUTION INTRAVENOUS at 07:54

## 2020-02-23 RX ADMIN — MORPHINE SULFATE 4 MG: 4 INJECTION INTRAVENOUS at 12:16

## 2020-02-23 RX ADMIN — OXYCODONE HYDROCHLORIDE AND ACETAMINOPHEN 2 TABLET: 5; 325 TABLET ORAL at 14:50

## 2020-02-23 NOTE — ASSESSMENT & PLAN NOTE
MRI foot/forefoot showed Localised fluid collection/abscess in the 4th toe at the level of the proximal phalanx measuring 1 7 x 1 3 x 2 5 cm  No associated septic arthritis or osteomyelitis  The adjacent extensor digitorum tendon is intact  Patient underwent I&D by Podiatry on 02/22  Cultures are pending  Wound care and dressing changes per Podiatry  Pain management p r n    Continue IV antibiotics

## 2020-02-23 NOTE — ASSESSMENT & PLAN NOTE
Patient with left foot cellulitis/abscess/tenia pedis  Was on Bactrim since Tuesday with worsening cellulitis  IV antibiotics with Ancef and Cleocin  Elevate left lower extremity  Follow-up culture results  Podiatry and ID on board  See above

## 2020-02-23 NOTE — PLAN OF CARE
Problem: PHYSICAL THERAPY ADULT  Goal: Performs mobility at highest level of function for planned discharge setting  See evaluation for individualized goals  Description  Treatment/Interventions: Functional transfer training, LE strengthening/ROM, Elevations, Therapeutic exercise, Endurance training, Bed mobility, Gait training, Spoke to nursing, OT  Equipment Recommended: Patrick Andres       See flowsheet documentation for full assessment, interventions and recommendations  Note:   Prognosis: Good  Problem List: Decreased endurance, Decreased strength, Decreased range of motion, Impaired balance, Decreased mobility, Impaired sensation, Obesity, Decreased skin integrity, Orthopedic restrictions, Pain  Assessment: Patient is a 27y/o M who presented with cellulitis of the LLE and an abscess of the Left foot/4th toe  Now s/p I&D L foot/4th toe  PMH significant for smoker and arthritis  Lives with significant other and friends in a [facility] with steps to the second floor  Was completely independent without an AD prior to admission and works full time  Current medical status includes weight bearing precautions, fall risk, high pain levels, poor safery awareness, decreased sensation, strength, balance, endurance and mobility  Patient was received ambulating in room with RW with a hop to pattern  Was not putting any weight through the LLE  Podiatry present during session and reported that patient can put as much weight as he feel comfortable with the surgical shoe on  Also noted that the incision is on the top of the foot  Patient required supervision for transfers, bed mobility and amb  He has a very fast gait is unsafe at times with the RW  He was observed leaning to the side while holding on the the walker  Education and verbal cues provided  Patient is not putting weight through the LLE at this time due to pain  Next session plan for stair training unless patient plans to scoot up on bottom   Recommending that patient return home with family support at discharge  Barriers to Discharge: Inaccessible home environment     Recommendation: Home with family support          See flowsheet documentation for full assessment

## 2020-02-23 NOTE — PLAN OF CARE
Problem: OCCUPATIONAL THERAPY ADULT  Goal: Performs self-care activities at highest level of function for planned discharge setting  See evaluation for individualized goals  Description  Treatment Interventions: ADL retraining, Functional transfer training, Patient/family training, Equipment evaluation/education, Compensatory technique education  Equipment Recommended: (shower chair)       See flowsheet documentation for full assessment, interventions and recommendations  Note:   Limitation: Decreased ADL status, Decreased self-care trans, Decreased high-level ADLs  Prognosis: Good  Assessment: Pt is a 30 y o  male seen for OT evaluation at [facility], admitted 2/20/2020 w/ Cellulitis of left foot  Pt now s/p left foot I & D on 2/22  OT completed expandedreview of pt's medical and social history  Comorbidities affecting pt's functional performance at time of assessment include: tobacco use, and abscess of 4th toe left foot  Personal factors affecting pt at time of IE include:difficulty performing ADLS, difficulty performing IADLS  and decreased functional mobility  Prior to admission, pt was living with significant other and friends in house with steps to manage and was independent with ADLs and functional mobility   Pt currently performing transfers with supervision, UB ADLs with set-up and LB ADLs with supervision/set-up  Upon evaluation, pt presents to OT below baseline due to the following performance deficits: decreased balance, decreased tolerance, increased pain and orthopedic restrictions  Pt to benefit from continued skilled OT tx while in the hospital to address deficits as defined above and maximize level of functional independence w ADL's and functional mobility  Occupational Performance areas to address include: bathing/shower, dressing, functional mobility, community mobility and job performance/volunteering  Based on findings, pt is of moderate complexity   At this time, OT recommendations at time of discharge are home with family support  Also recommend shower chair to maintain safety while bathing (once cleared by MD)       OT Discharge Recommendation: Home with family support

## 2020-02-23 NOTE — PLAN OF CARE
Problem: PAIN - ADULT  Goal: Verbalizes/displays adequate comfort level or baseline comfort level  Description  Interventions:  - Encourage patient to monitor pain and request assistance  - Assess pain using appropriate pain scale  - Administer analgesics based on type and severity of pain and evaluate response  - Implement non-pharmacological measures as appropriate and evaluate response  - Consider cultural and social influences on pain and pain management  - Notify physician/advanced practitioner if interventions unsuccessful or patient reports new pain  Encourage patient to use alternative to medication for pain relief  Ice applied, patient is refusing heat packs  Outcome: Needs improvement      Problem: INFECTION - ADULT  Goal: Absence or prevention of progression during hospitalization  Description  INTERVENTIONS:  - Assess and monitor for signs and symptoms of infection  - Monitor lab/diagnostic results  - Monitor all insertion sites, i e  indwelling lines, tubes, and drains  - Monitor endotracheal if appropriate and nasal secretions for changes in amount and color  - [place] appropriate cooling/warming therapies per order  - Administer medications as ordered  - Instruct and encourage patient and family to use good hand hygiene technique  - Identify and instruct in appropriate isolation precautions for identified infection/condition  Outcome: Progressing  Goal: Absence of fever/infection during neutropenic period  Description  INTERVENTIONS:  - Monitor WBC    Outcome: Progressing     Problem: Potential for Falls  Goal: Patient will remain free of falls  Description  INTERVENTIONS:  - Assess patient frequently for physical needs  -  Identify cognitive and physical deficits and behaviors that affect risk of falls    -  [place] fall precautions as indicated by assessment   - Educate patient/family on patient safety including physical limitations  - Instruct patient to call for assistance with activity based on assessment  - Modify environment to reduce risk of injury  - Consider OT/PT consult to assist with strengthening/mobility  Outcome: Progressing

## 2020-02-23 NOTE — PROGRESS NOTES
Progress Note - Se Baer 1990, 30 y o  male MRN: [id number]    Unit/Bed#: -01 Encounter: [id number]    Primary Care Provider: No primary care provider on file  Date and time admitted to hospital: 2/20/2020 12:48 PM        Abscess of fourth toe of left foot  Assessment & Plan  MRI foot/forefoot showed Localised fluid collection/abscess in the 4th toe at the level of the proximal phalanx measuring 1 7 x 1 3 x 2 5 cm  No associated septic arthritis or osteomyelitis  The adjacent extensor digitorum tendon is intact  Patient underwent I&D by Podiatry on 02/22  Cultures are pending  Wound care and dressing changes per Podiatry  Pain management p r n  Continue IV antibiotics    Tobacco use  Assessment & Plan  Smoking cessation counseling given  On nicotine patch    * Cellulitis of left foot  Assessment & Plan  Patient with left foot cellulitis/abscess/tenia pedis  Was on Bactrim since Tuesday with worsening cellulitis  IV antibiotics with Ancef and Cleocin  Elevate left lower extremity  Follow-up culture results  Podiatry and ID on board  See above  Labs & Imaging: I have personally reviewed pertinent reports  VTE Pharmacologic Prophylaxis: Enoxaparin (Lovenox)  VTE Mechanical Prophylaxis: sequential compression device    Code Status:   Level 1 - Full Code    Patient Centered Rounds: I have performed bedside rounds with nursing staff today  Discussions with Specialists or Other Care Team Provider:  Podiatry    Education and Discussions with Family / Patient:     Current Length of Stay: 3 day(s)    Current Patient Status: Inpatient   Certification Statement: The patient will continue to require additional inpatient hospital stay due to see my assessment and plan  Subjective:   Patient is seen and examined at bedside  Foot patient is well controlled with current pain regimen  Afebrile  No other complaints  All other ROS are negative      Objective:    Vitals: Blood pressure 123/74, pulse 89, temperature 98 2 °F (36 8 °C), resp  rate 18, height 5' 6" (1 676 m), weight 101 kg (221 lb 12 5 oz), SpO2 97 %  ,Body mass index is 35 8 kg/m²  SPO2 RA Rest      ED to Hosp-Admission (Current) from 2/20/2020 in [address] Med [facility]   SpO2  97 %   SpO2 Activity  At Rest   O2 Device  None (Room air)   O2 Flow Rate  2 L/min        I&O:     Intake/Output Summary (Last 24 hours) at 2/23/2020 1001  Last data filed at 2/22/2020 1016  Gross per 24 hour   Intake 300 ml   Output    Net 300 ml       Physical Exam:    General- Alert, lying comfortably in bed  Not in any acute distress  HEENT- FELISA, EOM intact  Neck- Supple, No JVD  CVS- regular, S1 and S2 normal   Chest- Bilateral Air entry, No rhochi, crackles or wheezing present  Abdomen- soft, nontender, not distended, no guarding or rigidity, BS+  Extremities-  No pedal edema, No calf tenderness  Left foot is in a dressing  CNS-   Alert, awake and orientedx3  No focal deficits present  Invasive Devices     Peripheral Intravenous Line            Peripheral IV 02/20/20 Right Antecubital 2 days                      Social History  reviewed  History reviewed  No pertinent family history   reviewed    Meds:  Current Facility-Administered Medications   Medication Dose Route Frequency Provider Last Rate Last Dose    acetaminophen (TYLENOL) tablet 650 mg  650 mg Oral Q6H PRN [facility], DPM   650 mg at 02/20/20 1935    ceFAZolin (ANCEF) IVPB (premix) 1,000 mg  1,000 mg Intravenous Q8H [facility],  mL/hr at 02/23/20 0754 1,000 mg at 02/23/20 0754    clindamycin (CLEOCIN) IVPB (premix) 600 mg  600 mg Intravenous Q8H [facility],  mL/hr at 02/23/20 0754 600 mg at 02/23/20 0754    enoxaparin (LOVENOX) subcutaneous injection 40 mg  40 mg Subcutaneous Daily [facility] DPM   40 mg at 02/23/20 1170    miconazole 2 % cream   Topical BID [facility], DP        morphine (PF) 4 mg/mL injection 4 mg  4 mg Intravenous Q4H PRN Raymundo Candelaria MD   4 mg at 02/23/20 0754    nicotine (NICODERM CQ) 21 mg/24 hr TD 24 hr patch 1 patch  1 patch Transdermal Daily Benji Jackson DPOLEGARIO   1 patch at 02/23/20 8862    ondansetron (ZOFRAN) injection 4 mg  4 mg Intravenous Q6H PRN Benji Jackson, DPM        oxyCODONE-acetaminophen (PERCOCET) 5-325 mg per tablet 1 tablet  1 tablet Oral Q6H PRN Raymundo Candelaria MD   1 tablet at 02/22/20 1416      Medications Prior to Admission   Medication    naproxen (NAPROSYN) 500 mg tablet    sulfamethoxazole-trimethoprim (BACTRIM DS) 800-160 mg per tablet       Labs:  Results from last 7 days   Lab Units 02/23/20  0434 02/22/20  0624 02/21/20  0545   WBC Thousand/uL 8 43 8 80 9 67   HEMOGLOBIN g/dL 15 3 15 1 15 6   HEMATOCRIT % 44 1 43 2 44 7   PLATELETS Thousands/uL 244 239 233   NEUTROS PCT % 57 62 63   LYMPHS PCT % 24 20 20   MONOS PCT % 12 12 10   EOS PCT % 5 4 5     Results from last 7 days   Lab Units 02/23/20  0434 02/22/20  0624 02/21/20  0545 02/20/20  1326   POTASSIUM mmol/L 4 3 4 2 4 3 3 8   CHLORIDE mmol/L 102 102 104 102   CO2 mmol/L 26 30 28 30   BUN mg/dL 11 9 10 10   CREATININE mg/dL 0 85 0 87 0 99 1 00   CALCIUM mg/dL 8 9 8 8 9 0 9 1   ALK PHOS U/L  --   --  51 55   ALT U/L  --   --  57 64   AST U/L  --   --  16 20     No results found for: TROPONINI, CKMB, CKTOTAL      Lab Results   Component Value Date    BLOODCX No Growth at 48 hrs  02/20/2020    BLOODCX No Growth at 48 hrs  02/20/2020         Imaging:  No results found for this or any previous visit  No results found for this or any previous visit      Last 24 Hours Medication List:     Current Facility-Administered Medications:  acetaminophen 650 mg Oral Q6H PRN Benji Jackson DPM    cefazolin 1,000 mg Intravenous Q8H Benji Jackson DPM Last Rate: 1,000 mg (02/23/20 0754)   clindamycin 600 mg Intravenous Q8H Benji Em, DPM Last Rate: 600 mg (02/23/20 0754)   enoxaparin 40 mg Subcutaneous Daily Benji Em, DPM miconazole  Topical BID Cat Larson DPM    morphine injection 4 mg Intravenous Q4H PRN Hemanth Lackey MD    nicotine 1 patch Transdermal Daily Cat Larson DPM    ondansetron 4 mg Intravenous Q6H PRN Cat Larson DPM    oxyCODONE-acetaminophen 1 tablet Oral Q6H PRN Hemanth Lackey MD         Today, Patient Was Seen By: Hemanth Lackey MD    ** Please Note: Dictation voice to text software may have been used in the creation of this document   **

## 2020-02-23 NOTE — PROGRESS NOTES
Progress Note - Infectious Disease   Librado Whiteside 30 y o  male MRN: [id number]  Unit/Bed#: -01 Encounter: [id number]    Assessment/Plan     1  Left cellulitis/Tinea pedis: Pt presented with increasing redness, swelling, and pain of his left foot, especially involving his left 4th toe c/w cellulitis  His underlying tinea pedis is the etiology for his current cellulitis since he was scratching btw his toes  Most likely due to Strep or Staph species  He does not have fever or elevated WBC count  He took Bactrim x 2 days without significant improvement so Strep is probably the etiology for his current presentation  Status post surgical debridement of the left lower extremity intraoperative wound cultures positive for Staph aureus 2/22     --Continue on clindamycin and Ancef IV  --Patient is S/P incision and drainage of the left lower extremity (Today 2/22)  --intraoperative deep tissue cultures 2/22 growing Staph aureus with sensitivities to follow  --No changes from my standpoint today          Vera Tesfaye, DO  Infectious Disease  [facility]   Cell [phone]      Subjective/Objective       Subjective:     Patient is postop day 1 from left lower extremity incision drainage by surgery the patient denies being in pain and no diarrhea no upset stomach    REVIEW OF SYSTEMS  GENERAL/CONSTITUTIONAL: The patient denies fever, fatigue, weakness, weight gain or weight loss    HEAD, EYES, EARS, NOSE AND THROAT: Eyes  The patient denies pain, redness, loss of vision, double or blurred vision, flashing lights or spots, dryness, sore throats, and hoarseness   CARDIOVASCULAR: The patient denies chest pain, irregular heartbeats, sudden changes in heartbeat or palpitation, shortness of breath, difficulty breathing at night, swollen legs RESPIRATORY: The patient denies chronic dry cough, coughing up blood, coughing up mucus and wheezing  GASTROINTESTINAL: The patient denies decreased appetite, nausea, vomiting, vomiting blood or coffee ground material, heartburn  GENITOURINARY: The patient denies difficult urination, pain or burning with urination, blood in the urine, cloudy or smoky urine, frequent need to urinate  MUSCULOSKELETAL: The patient denies arm, buttock, thigh or calf cramps  No joint or muscle pain  No muscle weakness or tenderness  No joint swelling, neck pain, back pain or major orthopedic injuries  SKIN AND BREASTS: The patient denies easy bruising, skin redness, skin rash, hives, sensitivity to sun exposure, tightness, nodules or bumps, hair loss, color changes in the hands or feet with cold, breast lump, breast pain or nipple discharge  NEUROLOGIC: The patient denies headache, dizziness, fainting, muscle spasm, loss of consciousness, sensitivity or pain in the hands and feet or memory loss  PSYCHIATRIC: The patient denies depression with thoughts of suicide, voices in ?? head telling ? ? to do things and has not been seen for psychiatric counseling or treatment  ENDOCRINE: The patient denies intolerance to hot or cold temperature, flushing, fingernail changes, increased thirst, increased salt intake or decreased sexual desire  HEMATOLOGIC/LYMPHATIC: The patient denies anemia, bleeding tendency or clotting tendency  ALLERGIC/IMMUNOLOGIC: The patient denies rhinitis, asthma, skin sensitivity, latex allergies or sensitivity  Objective:     Temp:  [98 2 °F (36 8 °C)-98 6 °F (37 °C)] 98 6 °F (37 °C)  HR:  [82-89] 87  Resp:  [18] 18  BP: (123-145)/() 145/103  SpO2:  [96 %-99 %] 96 %  Temp (24hrs), Av 4 °F (36 9 °C), Min:98 2 °F (36 8 °C), Max:98 6 °F (37 °C)  Current: Temperature: 98 6 °F (37 °C)    Physical Exam:      GENERAL APPEARANCE: Well developed, well nourished, in no acute distress  SKIN: Inspection of the skin reveals no rashes, ulcerations or petechiae    HEENT: The sclerae were anicteric and conjunctivae were pink and moist  Extraocular movements were intact and pupils were equal, round, and reactive to light with normal accommodation  NECK: Supple and symmetric  There was no thyroid enlargement, and no tenderness, or masses were felt  CHEST: Normal AP diameter and normal contour without any kyphoscoliosis  LUNGS: Auscultation of the lungs revealed normal breath sounds without any other adventitious sounds or rubs  CARDIOVASCULAR: There was a regular rate and rhythm without any murmurs, gallops, rubs  The carotid pulses were normal and 2+ bilaterally without bruits  ABDOMEN: Soft and nontender with normal bowel sounds  No ascites was noted  LYMPH NODES: No lymphadenopathy was appreciated in the neck, axillae or groin  EXTREMITIES:  surgical dressing & gauze located on the left lower extremity there is no signs of bleeding or drainage going through the dressing  NEUROLOGIC: Alert and oriented x 3  Normal affect    Invasive Devices     Peripheral Intravenous Line            Peripheral IV 02/20/20 Right Antecubital 3 days                  Labs:  CBC w/diff  Recent Labs     02/23/20 0434   WBC 8 43   HGB 15 3   HCT 44 1      NEUTOPHILPCT 57   LYMPHOPCT 24   MONOPCT 12   EOSPCT 5     BMP  Recent Labs     02/23/20  0434   K 4 3      CO2 26   BUN 11   CREATININE 0 85   CALCIUM 8 9     CMP  Recent Labs     02/21/20  0545  02/23/20  0434   K 4 3   < > 4 3      < > 102   CO2 28   < > 26   BUN 10   < > 11   CREATININE 0 99   < > 0 85   CALCIUM 9 0   < > 8 9   ALKPHOS 51  --   --    ALT 57  --   --    AST 16  --   --     < > = values in this interval not displayed  Cassie Dunn [facility]    Cultures:  Lab Results   Component Value Date    BLOODCX No Growth at 48 hrs  02/20/2020    BLOODCX No Growth at 48 hrs  02/20/2020     Lab Results   Component Value Date    WOUNDCULT 4+ Growth of Staphylococcus aureus (A) 02/22/2020     No results found for: URINECX  No results found for: SPUTUMCULTUR    MED:      Current Facility-Administered Medications:    acetaminophen (TYLENOL) tablet 650 mg, 650 mg, Oral, Q6H PRN, [place] Mary Kate, DPM, 650 mg at 02/20/20 1935    ceFAZolin (ANCEF) IVPB (premix) 1,000 mg, 1,000 mg, Intravenous, Q8H, Gabriel Fullera, DPM, Last Rate: 100 mL/hr at 02/23/20 0754, 1,000 mg at 02/23/20 0754    clindamycin (CLEOCIN) IVPB (premix) 600 mg, 600 mg, Intravenous, Q8H, Gabriel Fullera, DPM, Last Rate: 100 mL/hr at 02/23/20 0754, 600 mg at 02/23/20 0754    enoxaparin (LOVENOX) subcutaneous injection 40 mg, 40 mg, Subcutaneous, Daily, Gabriel Hartmann, DPM, 40 mg at 02/23/20 0529    miconazole 2 % cream, , Topical, BID, Gabriel Fullera, DPM    morphine (PF) 4 mg/mL injection 4 mg, 4 mg, Intravenous, Q4H PRN, Deny Arboleda MD, 4 mg at 02/23/20 1326    nicotine (NICODERM CQ) 21 mg/24 hr TD 24 hr patch 1 patch, 1 patch, Transdermal, Daily, Gabriel Hartmann DPM, 1 patch at 02/23/20 0842    ondansetron (ZOFRAN) injection 4 mg, 4 mg, Intravenous, Q6H PRN, Gabriel Fullera, DPM    oxyCODONE-acetaminophen (PERCOCET) 5-325 mg per tablet 1 tablet, 1 tablet, Oral, Q6H PRN, Deny Arboleda MD, 1 tablet at 02/22/20 1416    Principal Problem:    Cellulitis of left foot  Active Problems:    Tobacco use    Abscess of fourth toe of left foot      Rina Dobson DO

## 2020-02-23 NOTE — PHYSICAL THERAPY NOTE
PHYSICAL THERAPY EVAL       02/23/20 1321   Note Type   Note type Eval only   Pain Assessment   Pain Assessment Gaviria-Baker FACES   Gaviria-Baker FACES Pain Rating 4   Pain Type Acute pain   Pain Location Foot   Pain Orientation Left   Home Living   Type of [address] Two level  (12 steps to second floor)   Prior Function   Level of [place] Independent with ADLs and functional mobility   Lives With Significant other;Friend(s)   Receives Help From Friend(s)   ADL Assistance Independent   IADLs Independent   Falls in the last 6 months 0   Comments Works full time   Restrictions/Precautions   Carlton Omalley Bearing Precautions Per Order Yes   LLE Weight Bearing Per Order PWB   Other Precautions   (Per Dr Prater Santana weight he can tolerate )   General   Family/Caregiver Present No   Cognition   Overall Cognitive Status WFL   Arousal/Participation Alert   Orientation Level Oriented X4   Memory Within functional limits   Following Commands Follows all commands and directions without difficulty   RLE Assessment   RLE Assessment WNL   LLE Assessment   LLE Assessment WFL   Bed Mobility   Sit to Supine 5  Supervision   Additional Comments Received ambulating in room with RW   Transfers   Stand to Sit 5  Supervision   Additional items Armrests   Ambulation/Elevation   Gait pattern   (Hop to pattern, fast paced gait)   Gait Assistance 5  Supervision   Additional items Verbal cues   Assistive Device Rolling walker   Distance 25ft   Stair Management Assistance Not tested   Balance   Static Sitting Normal   Dynamic Sitting Normal   Static Standing Good   Dynamic Standing Fair +   Ambulatory Fair +   Endurance Deficit   Endurance Deficit No   Activity Tolerance   Activity Tolerance Patient limited by pain   Medical Staff Made Aware IZABEL Booth   Nurse Made Aware RN    Assessment   Prognosis Good   Problem List Decreased endurance;Decreased strength;Decreased range of motion; Impaired balance;Decreased mobility; Impaired sensation;Obesity; Decreased skin integrity;Orthopedic restrictions;Pain   Assessment Patient is a 27y/o M who presented with cellulitis of the LLE and an abscess of the Left foot/4th toe  Now s/p I&D L foot/4th toe  PMH significant for smoker and arthritis  Lives with significant other and friends in a [facility] with steps to the second floor  Was completely independent without an AD prior to admission and works full time  Current medical status includes weight bearing precautions, fall risk, high pain levels, poor safery awareness, decreased sensation, strength, balance, endurance and mobility  Patient was received ambulating in room with RW with a hop to pattern  Was not putting any weight through the LLE  Podiatry present during session and reported that patient can put as much weight as he feel comfortable with the surgical shoe on  Also noted that the incision is on the top of the foot  Patient required supervision for transfers, bed mobility and amb  He has a very fast gait is unsafe at times with the RW  He was observed leaning to the side while holding on the the walker  Education and verbal cues provided  Patient is not putting weight through the LLE at this time due to pain  Next session plan for stair training unless patient plans to scoot up on bottom  Recommending that patient return home with family support at discharge  Barriers to Discharge Inaccessible home environment   Goals   Patient Goals To go home   STG Expiration Date 03/01/20   Short Term Goal #1 1  perform supine<>sit with HOB flat and without the use of bedrails independently 2  perform sit<>stand transfers with use of UE's mod I 3  Amb 200ft with RW at a mod I level 4  Ascend/descend 12 stairs at a mod I level   PT Treatment Day 0   Plan   Treatment/Interventions Functional transfer training;LE strengthening/ROM; Elevations; Therapeutic exercise; Endurance training;Bed mobility;Gait training;Spoke to nursing;OT   PT Frequency Other (Comment)  (3-5x/wk)   Recommendation   Recommendation Home with family support   Equipment Recommended Walker   Modified [place] Scale   Modified [place] Scale 3   Jailene Quesada, PT            Patient Name: Isabel Howard  [id number]'[id number] Date: 2/23/2020

## 2020-02-23 NOTE — OCCUPATIONAL THERAPY NOTE
Occupational Therapy Evaluation     Patient Name: Kenya CHAUDHARI Date: 2/23/2020  Problem List  Principal Problem:    Cellulitis of left foot  Active Problems:    Tobacco use    Abscess of fourth toe of left foot    Past Medical History  Past Medical History:   Diagnosis Date    Arthritis      Past Surgical History  History reviewed  No pertinent surgical history  02/23/20 1320   Note Type   Note type Eval only   Restrictions/Precautions   Weight Bearing Precautions Per Order Yes   LLE Weight Bearing Per Order PWB   Other Precautions Pain; Fall Risk   Pain Assessment   Pain Assessment Gaviria-Baker FACES   Gaviria-Baker FACES Pain Rating 4   Pain Type Acute pain   Pain Location Foot   Pain Orientation Left   Home Living   Type of Home House   Home Layout Two level;Bed/bath upstairs   Bathroom Shower/Tub Tub/shower unit   Bathroom Toilet Standard   Prior Function   Level of [place] Independent with ADLs and functional mobility   Lives With Significant other;Friend(s)   Receives Help From Home health   ADL Assistance Independent   IADLs Independent   Falls in the last 6 months 0   Vocational Full time employment   Psychosocial   Psychosocial (WDL) WDL   Subjective   Subjective Pt states "I can't wait to get out of here  I'm going crazy in this room"   ADL   Eating Assistance 7  Independent   Eating Deficit Setup   Grooming Assistance 7  Independent   Grooming Deficit Setup   UB Bathing Assistance 5  Supervision/Setup   LB Bathing Assistance 5  Supervision/Setup   UB Dressing Assistance 5  Supervision/Setup   LB Dressing Assistance 5  Supervision/Setup   Toileting Assistance  5  Supervision/Setup   Bed Mobility   Sit to Supine 5  Supervision   Additional Comments Pt received OOB   Transfers   Sit to Stand 5  Supervision   Additional items Verbal cues; Bedrails  (RW)   Stand to Sit 5  Supervision   Additional items Verbal cues; Bedrails  (RW)   Stand pivot 5  Supervision   Additional items Verbal cues;Bedrails  (RW)   Functional Mobility   Functional Mobility 5  Supervision   Additional Comments RW   Balance   Static Sitting Normal   Dynamic Sitting Normal   Static Standing Good   Dynamic Standing Fair +   Activity Tolerance   Activity Tolerance Patient limited by pain   Medical Staff Eugenia Lance, Dr Seema Russo   LUKAYLIN Assessment   LUE Assessment WFL   Cognition   Overall Cognitive Status WFL   Arousal/Participation Alert   Attention Within functional limits   Orientation Level Oriented X4   Memory Within functional limits   Following Commands Follows all commands and directions without difficulty   Assessment   Limitation Decreased ADL status; Decreased self-care trans;Decreased high-level ADLs   Prognosis Good   Assessment Pt is a 30 y o  male seen for OT evaluation at [facility], admitted 2/20/2020 w/ Cellulitis of left foot  Pt now s/p left foot I & D on 2/22  OT completed expandedreview of pt's medical and social history  Comorbidities affecting pt's functional performance at time of assessment include: tobacco use, and abscess of 4th toe left foot  Personal factors affecting pt at time of IE include:difficulty performing ADLS, difficulty performing IADLS  and decreased functional mobility  Prior to admission, pt was living with significant other and friends in house with steps to manage and was independent with ADLs and functional mobility   Pt currently performing transfers with supervision, UB ADLs with set-up and LB ADLs with supervision/set-up  Upon evaluation, pt presents to OT below baseline due to the following performance deficits: decreased balance, decreased tolerance, increased pain and orthopedic restrictions  Pt to benefit from continued skilled OT tx while in the hospital to address deficits as defined above and maximize level of functional independence w ADL's and functional mobility   Occupational Performance areas to address include: bathing/shower, dressing, functional mobility, community mobility and job performance/volunteering  Based on findings, pt is of moderate complexity  At this time, OT recommendations at time of discharge are home with family support  Also recommend shower chair to maintain safety while bathing (once cleared by MD)  Goals   Patient Goals Pt wishes to get home   Plan   Treatment Interventions ADL retraining;Functional transfer training;Patient/family training;Equipment evaluation/education; Compensatory technique education   Goal Expiration Date 03/04/20   OT Frequency 2-3x/wk   Recommendation   OT Discharge Recommendation Home with family support   Equipment Recommended   (shower chair)         Pt will achieve the following goals within 10 days  *Pt will complete UB/LB bathing and dressing with mod I     *Pt will complete toileting (hygiene and clothing management) with mod I  using DME as needed  *Pt will complete bed mobility with mod I, with bed flat and no side rail to prep for purposeful tasks    *Pt will perform functional transfers with on/off all surfaces with mod I using DME as needed w/ G balance/safety  *Pt will demonstrate increased activity tolerance in order to complete ADL routine          Juan Luis Garnica OTR/L

## 2020-02-23 NOTE — PROGRESS NOTES
Progress Note - Podiatry  Shelley Fischer 30 y o  male MRN: [id number]  Unit/Bed#: -01 Encounter: [id number]    Assessment/Plan:  Abscess left foot/4th toe  Cellulitis left foot/leg   -postop day #1 S/P I&D deep abscess left foot/4th Toe   -continue with current antibiotics per Infectious Disease, OR C&S pending   -wound irrigated with 200 cc normal saline solution and packed with 6" of quarter-inch iodoform    -anticipate the need for another 2 more days of IV antibiotics  Subjective/Objective   Chief Complaint:   Chief Complaint   Patient presents with    Foot Swelling     Pt with left foot swelling and pain since friday 2/14/20, denies injury  [facility] and is here today for continued swelling and pain  Has been on naproxyn and bactrim since 2/18/20  Subjective:  20-year-old white male S/P I&D LFT seen today PO Day #1 after completing his physical therapy  Relates having significant pain early this morning but has since abated moderately  Denies any fever or increasing/discomfort  Blood pressure 123/74, pulse 89, temperature 98 2 °F (36 8 °C), resp  rate 18, height 5' 6" (1 676 m), weight 101 kg (221 lb 12 5 oz), SpO2 97 %  ,Body mass index is 35 8 kg/m²  Invasive Devices     Peripheral Intravenous Line            Peripheral IV 02/20/20 Right Antecubital 3 days                Physical Exam:   General appearance: alert, cooperative and no distress  Neuro/Vascular:  Grossly intact with palpable pedal pulses and normal sensation  Extremity:  Surgical dressing intact to left foot no strike through drainage noted  Surgical incision demarcating along the lateral aspect of the proximal interphalangeal joint with superficial slough/shedding of necrotic skin  No active purulence noted  No active necrosis noted  Moderate erythema/edema with calor present of the forefoot though diminished from preop                    Labs and other studies:   CBC w/diff  Results from last 7 days   Lab Units 02/23/20  0434   WBC Thousand/uL 8 43   HEMOGLOBIN g/dL 15 3   HEMATOCRIT % 44 1   PLATELETS Thousands/uL 244   NEUTROS PCT % 57   LYMPHS PCT % 24   MONOS PCT % 12   EOS PCT % 5     BMP  Results from last 7 days   Lab Units 02/23/20  0434   POTASSIUM mmol/L 4 3   CHLORIDE mmol/L 102   CO2 mmol/L 26   BUN mg/dL 11   CREATININE mg/dL 0 85   CALCIUM mg/dL 8 9     CMP  Results from last 7 days   Lab Units 02/23/20  0434  02/21/20  0545   POTASSIUM mmol/L 4 3   < > 4 3   CHLORIDE mmol/L 102   < > 104   CO2 mmol/L 26   < > 28   BUN mg/dL 11   < > 10   CREATININE mg/dL 0 85   < > 0 99   CALCIUM mg/dL 8 9   < > 9 0   ALK PHOS U/L  --   --  51   ALT U/L  --   --  57   AST U/L  --   --  16    < > = values in this interval not displayed         @Culture@  Lab Results   Component Value Date    BLOODCX No Growth at 48 hrs  02/20/2020    BLOODCX No Growth at 48 hrs  02/20/2020         Current Facility-Administered Medications:     acetaminophen (TYLENOL) tablet 650 mg, 650 mg, Oral, Q6H PRN, Rubbie Lindsay, DPM, 650 mg at 02/20/20 1935    ceFAZolin (ANCEF) IVPB (premix) 1,000 mg, 1,000 mg, Intravenous, Q8H, Rubbie Lindsay, DPM, Last Rate: 100 mL/hr at 02/23/20 0754, 1,000 mg at 02/23/20 0754    clindamycin (CLEOCIN) IVPB (premix) 600 mg, 600 mg, Intravenous, Q8H, Rubbie Lindsay, DPM, Last Rate: 100 mL/hr at 02/23/20 0754, 600 mg at 02/23/20 0754    enoxaparin (LOVENOX) subcutaneous injection 40 mg, 40 mg, Subcutaneous, Daily, Rubbie Lindsay, DPM, 40 mg at 02/23/20 9237    miconazole 2 % cream, , Topical, BID, Rubbie Lindsay, DPM    morphine (PF) 4 mg/mL injection 4 mg, 4 mg, Intravenous, Q4H PRN, Marissa Gustafson MD, 4 mg at 02/23/20 1216    nicotine (NICODERM CQ) 21 mg/24 hr TD 24 hr patch 1 patch, 1 patch, Transdermal, Daily, Rubbie Lindsay, DPM, 1 patch at 02/23/20 0842    ondansetron (ZOFRAN) injection 4 mg, 4 mg, Intravenous, Q6H PRN, Rubzane Lindsay, DPM    oxyCODONE-acetaminophen (PERCOCET) 5-325 mg per tablet 1 tablet, 1 tablet, Oral, Q6H PRN, Sandra Miguel MD, 1 tablet at 02/22/20 1606    Imaging: I have personally reviewed pertinent films in PACS  EKG, Pathology, and Other Studies: I have personally reviewed pertinent reports    VTE Pharmacologic Prophylaxis: Enoxaparin (Lovenox)  VTE Mechanical Prophylaxis: sequential compression device    Pat Sims DPM

## 2020-02-24 LAB
BACTERIA SPEC ANAEROBE CULT: NORMAL
BASOPHILS # BLD AUTO: 0.04 THOUSANDS/ΜL (ref 0–0.1)
BASOPHILS NFR BLD AUTO: 1 % (ref 0–1)
EOSINOPHIL # BLD AUTO: 0.48 THOUSAND/ΜL (ref 0–0.61)
EOSINOPHIL NFR BLD AUTO: 7 % (ref 0–6)
ERYTHROCYTE [DISTWIDTH] IN BLOOD BY AUTOMATED COUNT: 12.7 % (ref 11.6–15.1)
HCT VFR BLD AUTO: 42.7 % (ref 36.5–49.3)
HGB BLD-MCNC: 14.9 G/DL (ref 12–17)
IMM GRANULOCYTES # BLD AUTO: 0.03 THOUSAND/UL (ref 0–0.2)
IMM GRANULOCYTES NFR BLD AUTO: 0 % (ref 0–2)
LYMPHOCYTES # BLD AUTO: 2.36 THOUSANDS/ΜL (ref 0.6–4.47)
LYMPHOCYTES NFR BLD AUTO: 34 % (ref 14–44)
MCH RBC QN AUTO: 30.7 PG (ref 26.8–34.3)
MCHC RBC AUTO-ENTMCNC: 34.9 G/DL (ref 31.4–37.4)
MCV RBC AUTO: 88 FL (ref 82–98)
MONOCYTES # BLD AUTO: 0.92 THOUSAND/ΜL (ref 0.17–1.22)
MONOCYTES NFR BLD AUTO: 13 % (ref 4–12)
NEUTROPHILS # BLD AUTO: 3.11 THOUSANDS/ΜL (ref 1.85–7.62)
NEUTS SEG NFR BLD AUTO: 45 % (ref 43–75)
NRBC BLD AUTO-RTO: 0 /100 WBCS
PLATELET # BLD AUTO: 257 THOUSANDS/UL (ref 149–390)
PMV BLD AUTO: 9 FL (ref 8.9–12.7)
RBC # BLD AUTO: 4.85 MILLION/UL (ref 3.88–5.62)
WBC # BLD AUTO: 6.94 THOUSAND/UL (ref 4.31–10.16)

## 2020-02-24 PROCEDURE — 99232 SBSQ HOSP IP/OBS MODERATE 35: CPT | Performed by: PHYSICIAN ASSISTANT

## 2020-02-24 PROCEDURE — 85025 COMPLETE CBC W/AUTO DIFF WBC: CPT | Performed by: INTERNAL MEDICINE

## 2020-02-24 PROCEDURE — 97116 GAIT TRAINING THERAPY: CPT

## 2020-02-24 RX ORDER — KETOROLAC TROMETHAMINE 30 MG/ML
30 INJECTION, SOLUTION INTRAMUSCULAR; INTRAVENOUS EVERY 6 HOURS SCHEDULED
Status: DISCONTINUED | OUTPATIENT
Start: 2020-02-24 | End: 2020-02-25 | Stop reason: HOSPADM

## 2020-02-24 RX ORDER — MORPHINE SULFATE 4 MG/ML
4 INJECTION, SOLUTION INTRAMUSCULAR; INTRAVENOUS EVERY 4 HOURS PRN
Status: DISCONTINUED | OUTPATIENT
Start: 2020-02-24 | End: 2020-02-25 | Stop reason: HOSPADM

## 2020-02-24 RX ORDER — OXYCODONE HYDROCHLORIDE 5 MG/1
10 TABLET ORAL EVERY 4 HOURS PRN
Status: DISCONTINUED | OUTPATIENT
Start: 2020-02-24 | End: 2020-02-25 | Stop reason: HOSPADM

## 2020-02-24 RX ORDER — OXYCODONE HYDROCHLORIDE 5 MG/1
5 TABLET ORAL EVERY 4 HOURS PRN
Status: DISCONTINUED | OUTPATIENT
Start: 2020-02-24 | End: 2020-02-25 | Stop reason: HOSPADM

## 2020-02-24 RX ORDER — ACETAMINOPHEN 325 MG/1
975 TABLET ORAL EVERY 8 HOURS SCHEDULED
Status: DISCONTINUED | OUTPATIENT
Start: 2020-02-24 | End: 2020-02-25 | Stop reason: HOSPADM

## 2020-02-24 RX ADMIN — CLINDAMYCIN IN 5 PERCENT DEXTROSE 600 MG: 12 INJECTION, SOLUTION INTRAVENOUS at 16:13

## 2020-02-24 RX ADMIN — MICONAZOLE NITRATE: 20 CREAM TOPICAL at 17:04

## 2020-02-24 RX ADMIN — MORPHINE SULFATE 4 MG: 4 INJECTION INTRAVENOUS at 07:53

## 2020-02-24 RX ADMIN — CEFAZOLIN SODIUM 1000 MG: 1 SOLUTION INTRAVENOUS at 00:25

## 2020-02-24 RX ADMIN — NICOTINE 1 PATCH: 21 PATCH, EXTENDED RELEASE TRANSDERMAL at 08:07

## 2020-02-24 RX ADMIN — KETOROLAC TROMETHAMINE 30 MG: 30 INJECTION, SOLUTION INTRAMUSCULAR; INTRAVENOUS at 18:33

## 2020-02-24 RX ADMIN — OXYCODONE HYDROCHLORIDE 5 MG: 5 TABLET ORAL at 13:54

## 2020-02-24 RX ADMIN — CLINDAMYCIN IN 5 PERCENT DEXTROSE 600 MG: 12 INJECTION, SOLUTION INTRAVENOUS at 09:40

## 2020-02-24 RX ADMIN — CEFAZOLIN SODIUM 1000 MG: 1 SOLUTION INTRAVENOUS at 07:54

## 2020-02-24 RX ADMIN — ACETAMINOPHEN 975 MG: 325 TABLET ORAL at 13:52

## 2020-02-24 RX ADMIN — CLINDAMYCIN IN 5 PERCENT DEXTROSE 600 MG: 12 INJECTION, SOLUTION INTRAVENOUS at 01:14

## 2020-02-24 RX ADMIN — OXYCODONE HYDROCHLORIDE 5 MG: 5 TABLET ORAL at 23:17

## 2020-02-24 RX ADMIN — ENOXAPARIN SODIUM 40 MG: 40 INJECTION SUBCUTANEOUS at 08:08

## 2020-02-24 RX ADMIN — MORPHINE SULFATE 4 MG: 4 INJECTION INTRAVENOUS at 17:01

## 2020-02-24 RX ADMIN — MORPHINE SULFATE 4 MG: 4 INJECTION INTRAVENOUS at 02:23

## 2020-02-24 RX ADMIN — CEFAZOLIN SODIUM 1000 MG: 1 SOLUTION INTRAVENOUS at 15:33

## 2020-02-24 RX ADMIN — KETOROLAC TROMETHAMINE 30 MG: 30 INJECTION, SOLUTION INTRAMUSCULAR; INTRAVENOUS at 11:58

## 2020-02-24 NOTE — ASSESSMENT & PLAN NOTE
· Patient with left foot cellulitis/abscess/tenia pedis  Was on Bactrim since Tuesday with worsening cellulitis    · IV antibiotics with Ancef and Cleocin  · Elevate left lower extremity  · Podiatry and ID on board

## 2020-02-24 NOTE — PROGRESS NOTES
Pleasant, very engaging, young man  Needed to explain the progression of his infection as well as show post op picture  Stressed that he was a very hard working person who found it difficult to depend on others for anything  Resolved to be more careful with medical needs in the future and blamed himself for his infection  Very appreciative of all staff  Not a spiritually minded person  02/24/20 [address] [place] [address] Affiliation None at present   Stress Factors   Patient Stress Factors Other (Comment)  (Being out of work   Receiving help from others,)   Coping Responses   Patient Coping Accepting;Denial;Open/discussion   Plan of Care   Assessment Completed by: Unit visit

## 2020-02-24 NOTE — PROGRESS NOTES
Pastoral Care Progress Note    2020  Patient: Omayra Casey : 1990  Admission Date & Time: 2020 1248  MRN: [id number] [facility]: [id number]                     Chaplaincy Interventions Utilized:   Empowerment: Encouraged self-care and Provided chaplaincy education    Exploration: Explored emotional needs & resources, Explored relational needs & resources, Explored spiritual needs & resources and Facilitated story telling        Relationship Building: Cultivated a relationship of care and support, Listened empathically, Hospitality and Provided silent and supportive presence                Chaplaincy Outcomes Achieved:  Expressed gratitude, Expressed humor, Identified meaningful connections and Identified priorities          Spiritual Coping Strategies Utilized:   No spiritual coping       20 [facility] None at present   Stress Factors   Patient Stress Factors Other (Comment)  (Being out of work   Receiving help from others,)   Coping Responses   Patient Coping Accepting;Denial;Open/discussion   Plan of Care   Assessment Completed by: Unit visit

## 2020-02-24 NOTE — ASSESSMENT & PLAN NOTE
· MRI foot/forefoot showed Localised fluid collection/abscess in the 4th toe at the level of the proximal phalanx measuring 1 7 x 1 3 x 2 5 cm  No associated septic arthritis or osteomyelitis  The adjacent extensor digitorum tendon is intact  · Patient underwent I&D by Podiatry on 02/22/2020  · Final cultures are pending  Prelim (+) staph  · Wound care and dressing changes per Podiatry  · Will adjust pain regimen:  · Add scheduled Tylenol 975 mg every 8 hours  · Add scheduled Toradol IV 30 mg every 6 hours for maximum of 48 hours  · Discontinue Percocet  · PRN Oxycodone 5 mg every 4 hours as needed for moderate pain  · PRN Oxycodone 10 mg every 4 hours as needed for severe pain  · Continue PRN Morphine for breakthrough  · Continue IV antibiotics likely through tomorrow

## 2020-02-24 NOTE — PROGRESS NOTES
Progress Note - John Montague 1990, 30 y o  male MRN: [id number]  Unit/Bed#: -01 Encounter: [id number]  Primary Care Provider: No primary care provider on file  Date and time admitted to hospital: 2/20/2020 12:48 PM    * Cellulitis of left foot  Assessment & Plan  · Patient with left foot cellulitis/abscess/tenia pedis  Was on Bactrim since Tuesday with worsening cellulitis  · IV antibiotics with Ancef and Cleocin  · Elevate left lower extremity  · Podiatry and ID on board    Abscess of fourth toe of left foot  Assessment & Plan  · MRI foot/forefoot showed Localised fluid collection/abscess in the 4th toe at the level of the proximal phalanx measuring 1 7 x 1 3 x 2 5 cm  No associated septic arthritis or osteomyelitis  The adjacent extensor digitorum tendon is intact  · Patient underwent I&D by Podiatry on 02/22/2020  · Final cultures are pending  Prelim (+) staph  · Wound care and dressing changes per Podiatry  · Will adjust pain regimen:  · Add scheduled Tylenol 975 mg every 8 hours  · Add scheduled Toradol IV 30 mg every 6 hours for maximum of 48 hours  · Discontinue Percocet  · PRN Oxycodone 5 mg every 4 hours as needed for moderate pain  · PRN Oxycodone 10 mg every 4 hours as needed for severe pain  · Continue PRN Morphine for breakthrough  · Continue IV antibiotics likely through tomorrow  Tobacco use  Assessment & Plan  · Encourage cessation  · Continue nicotine patch  VTE Pharmacologic Prophylaxis:   Pharmacologic: Enoxaparin (Lovenox)  Mechanical: Mechanical VTE prophylaxis in place  Patient Centered Rounds: I have performed bedside rounds with nursing staff today  Discussions with Specialists or Other Care Team Provider: None  Education and Discussions with Family / Patient: All patient questions answered to the best of my ability  Time Spent for Care: 20 minutes  More than 50% of total time spent on counseling and coordination of care as described above      Current Length of Stay: 4 day(s)  Current Patient Status: Inpatient   Certification Statement: The patient will continue to require additional inpatient hospital stay due to continued need for IV antibiotics and better pain control  Discharge Plan: Patient is not yet medically stable for discharge  Anticipate discharge in another 24-48 hours once cleared to transition to PO antibiotics and pain is well controlled  Code Status: Level 1 - Full Code    Subjective:   Patient continues to complain of significant pain in his left foot but is afraid to take any pain medications so is waiting it out in between doses  He is able to ambulate with the surgical shoe in place using a walker  He denies any diarrhea  Objective:   Vitals:   Temp (24hrs), Av 2 °F (36 8 °C), Min:97 6 °F (36 4 °C), Max:98 6 °F (37 °C)    Temp:  [97 6 °F (36 4 °C)-98 6 °F (37 °C)] 97 6 °F (36 4 °C)  HR:  [62-94] 62  Resp:  [17-18] 18  BP: (126-145)/() 128/66  SpO2:  [95 %-98 %] 97 %  Body mass index is 35 41 kg/m²  Input and Output Summary (last 24 hours):     No intake or output data in the 24 hours ending 20 1151    Physical Exam:     Physical Exam   HENT:   Head: Normocephalic and atraumatic  Mouth/Throat: Oropharynx is clear and moist and mucous membranes are normal    Eyes: No scleral icterus  Cardiovascular: Normal rate and regular rhythm  No murmur heard  Pulmonary/Chest: Breath sounds normal  He has no wheezes  He has no rales  He exhibits no tenderness  Abdominal: Soft  Bowel sounds are normal  He exhibits no distension  There is no tenderness  Musculoskeletal: Normal range of motion  He exhibits no edema  Left foot ACE and gauzed wrapped  Surgical shoe in place  Skin: Skin is warm and dry  No rash noted  Psychiatric: He has a normal mood and affect  Vitals reviewed      Additional Data:   Labs:  Results from last 7 days   Lab Units 20  0507   WBC Thousand/uL 6 94   HEMOGLOBIN g/dL 14 9 HEMATOCRIT % 42 7   PLATELETS Thousands/uL 257   NEUTROS PCT % 45   LYMPHS PCT % 34   MONOS PCT % 13*   EOS PCT % 7*     Results from last 7 days   Lab Units 02/23/20  0434  02/21/20  0545   POTASSIUM mmol/L 4 3   < > 4 3   CHLORIDE mmol/L 102   < > 104   CO2 mmol/L 26   < > 28   BUN mg/dL 11   < > 10   CREATININE mg/dL 0 85   < > 0 99   CALCIUM mg/dL 8 9   < > 9 0   ALK PHOS U/L  --   --  51   ALT U/L  --   --  57   AST U/L  --   --  16    < > = values in this interval not displayed  * I Have Reviewed All Lab Data Listed Above  * Additional Pertinent Lab Tests Reviewed:  All Labs Within Last 24 Hours Reviewed    Imaging:    Imaging Reports Reviewed Today Include:  None new    Cultures:   Blood Culture:   Lab Results   Component Value Date    BLOODCX No Growth at 72 hrs  02/20/2020    BLOODCX No Growth at 72 hrs  02/20/2020     Urine Culture: No results found for: URINECX  Sputum Culture: No components found for: SPUTUMCX  Wound Culture:   Lab Results   Component Value Date    WOUNDCULT 4+ Growth of Staphylococcus aureus (A) 02/22/2020       Last 24 Hours Medication List:     Current Facility-Administered Medications:  acetaminophen 650 mg Oral Q6H PRN Nicole Greco, DPM    acetaminophen 975 mg Oral [facility] Valentin Lugo PA-C    cefazolin 1,000 mg Intravenous Q8H Ancil Angus, DPM Last Rate: 1,000 mg (02/24/20 0754)   clindamycin 600 mg Intravenous Q8H Nicole Greco, DPM Last Rate: 600 mg (02/24/20 0940)   enoxaparin 40 mg Subcutaneous Daily Nicole Angus, DPOLEGARIO    ketorolac 30 mg Intravenous Q6H [address] Valentin Lugo PA-C    miconazole  Topical BID Nicole Greco DPM    morphine injection 4 mg Intravenous Q4H PRN Valentin Lugo PA-C    nicotine 1 patch Transdermal Daily Nicole Greoc DPM    ondansetron 4 mg Intravenous Q6H PRN Nicole Greco DPM    oxyCODONE 10 mg Oral Q4H PRN Valentin Lugo PA-C    oxyCODONE 5 mg Oral Q4H PRN Valentin Lugo PA-C         Today, Patient Was Seen By: Valentin Lugo, HARPREET    ** Please Note: Dragon 360 Dictation voice to text software may have been used in the creation of this document   **

## 2020-02-24 NOTE — PROGRESS NOTES
Eugenia Route  30 y o   male  1990  mrn [id number]    Assessment/Plan:  1  Left foot cellulitis/Staph aureus left 4th toe abscess/Tinea pedis: No fever and WBC count is nml post-op  MRI of left foot showed abscess by left 4th toe but no osteo  He underwent I & D of left 4th toe abscess by Podiatry  OR cx is positive for Staph aureus  Bld cx's are neg  Pt presented with increasing redness, swelling, and pain of his left foot, especially involving his left 4th toe c/w cellulitis  His underlying tinea pedis is the etiology for his current cellulitis since he was scratching btw his toes  Most likely due to Strep or Staph species  He did not have fever or elevated WBC count  He took Bactrim x 2 days without significant improvement       A  Cont Ancef and Clinda for now    B  Awaiting final results of OR cx - will re-adjust abx according to the susceptibility results  C  Cont to keep left foot elevated as much as possible  D  Cont local wound care as per Podiatry  E   Cont topical tx of tinea pedis          Subjective: Still has some left foot pain    Objective:  Tmax: 98 6  Lungs: Clear  Abd: +BS, soft, nontender  Ext: +Intact dressing on left foot    Labs:  CBC w/diff  Recent Labs     02/24/20  0507   WBC 6 94   HGB 14 9   HCT 42 7      NEUTOPHILPCT 45   LYMPHOPCT 34   MONOPCT 13*   EOSPCT 7*     BMP  Recent Labs     02/23/20  0434   K 4 3      CO2 26   BUN 11   CREATININE 0 85   CALCIUM 8 9     CMP  Recent Labs     02/23/20  0434   K 4 3      CO2 26   BUN 11   CREATININE 0 85   CALCIUM 8 9        labrc    Cultures:  Lab Results   Component Value Date    BLOODCX No Growth at 72 hrs  02/20/2020    BLOODCX No Growth at 72 hrs  02/20/2020     Lab Results   Component Value Date    WOUNDCULT 4+ Growth of Staphylococcus aureus (A) 02/22/2020     No results found for: URINECX  No results found for: SPUTUMCULTUR    MED:  Ancef: #5  Clinda: #5  Abx: #7         Current Facility-Administered Medications:   acetaminophen (TYLENOL) tablet 650 mg, 650 mg, Oral, Q6H PRN, Nora Mayers DPM, 650 mg at 02/20/20 1935    acetaminophen (TYLENOL) tablet 975 mg, 975 mg, Oral, Q8H [facility], [facility]    ceFAZolin (ANCEF) IVPB (premix) 1,000 mg, 1,000 mg, Intravenous, Q8H, Nora Mayers DPM, Last Rate: 100 mL/hr at 02/24/20 0754, 1,000 mg at 02/24/20 0754    clindamycin (CLEOCIN) IVPB (premix) 600 mg, 600 mg, Intravenous, Q8H, Nora Mayers DPM, Last Rate: 100 mL/hr at 02/24/20 0940, 600 mg at 02/24/20 0940    enoxaparin (LOVENOX) subcutaneous injection 40 mg, 40 mg, Subcutaneous, Daily, Nora Mayers DPM, 40 mg at 02/24/20 0808    ketorolac (TORADOL) injection 30 mg, 30 mg, Intravenous, Q6H [facility], [facility], 30 mg at 02/24/20 1158    miconazole 2 % cream, , Topical, BID, Nora Mayers DPM, Stopped at 02/24/20 0900    morphine (PF) 4 mg/mL injection 4 mg, 4 mg, Intravenous, Q4H PRN, RUBIO Le    nicotine (NICODERM CQ) 21 mg/24 hr TD 24 hr patch 1 patch, 1 patch, Transdermal, Daily, Nora Mayers DPM, 1 patch at 02/24/20 0807    ondansetron (ZOFRAN) injection 4 mg, 4 mg, Intravenous, Q6H PRN, Nora Mayers DPM    oxyCODONE (ROXICODONE) IR tablet 10 mg, 10 mg, Oral, Q4H PRN, Pierce Soto PA-C    oxyCODONE (ROXICODONE) IR tablet 5 mg, 5 mg, Oral, Q4H PRN, Pierce Soto PA-C    Principal Problem:    Cellulitis of left foot  Active Problems:    Tobacco use    Abscess of fourth toe of left foot      Mirlande Bronwood, MD

## 2020-02-24 NOTE — PLAN OF CARE
Problem: PAIN - ADULT  Goal: Verbalizes/displays adequate comfort level or baseline comfort level  Description  Interventions:  - Encourage patient to monitor pain and request assistance  - Assess pain using appropriate pain scale  - Administer analgesics based on type and severity of pain and evaluate response  - Implement non-pharmacological measures as appropriate and evaluate response  - Consider cultural and social influences on pain and pain management  - Notify physician/advanced practitioner if interventions unsuccessful or patient reports new pain  Outcome: Progressing     Problem: INFECTION - ADULT  Goal: Absence or prevention of progression during hospitalization  Description  INTERVENTIONS:  - Assess and monitor for signs and symptoms of infection  - Monitor lab/diagnostic results  - Monitor all insertion sites, i e  indwelling lines, tubes, and drains  - Monitor endotracheal if appropriate and nasal secretions for changes in amount and color  - [place] appropriate cooling/warming therapies per order  - Administer medications as ordered  - Instruct and encourage patient and family to use good hand hygiene technique  - Identify and instruct in appropriate isolation precautions for identified infection/condition  Outcome: Progressing  Goal: Absence of fever/infection during neutropenic period  Description  INTERVENTIONS:  - Monitor WBC    Outcome: Progressing     Problem: Potential for Falls  Goal: Patient will remain free of falls  Description  INTERVENTIONS:  - Assess patient frequently for physical needs  -  Identify cognitive and physical deficits and behaviors that affect risk of falls    -  [place] fall precautions as indicated by assessment   - Educate patient/family on patient safety including physical limitations  - Instruct patient to call for assistance with activity based on assessment  - Modify environment to reduce risk of injury  - Consider OT/PT consult to assist with strengthening/mobility  Outcome: Progressing

## 2020-02-25 LAB
BACTERIA BLD CULT: NORMAL
BACTERIA BLD CULT: NORMAL
BACTERIA WND AEROBE CULT: ABNORMAL
GRAM STN SPEC: ABNORMAL
GRAM STN SPEC: ABNORMAL

## 2020-02-25 PROCEDURE — 99239 HOSP IP/OBS DSCHRG MGMT >30: CPT | Performed by: NURSE PRACTITIONER

## 2020-02-25 RX ORDER — SULFAMETHOXAZOLE AND TRIMETHOPRIM 800; 160 MG/1; MG/1
1 TABLET ORAL 3 TIMES DAILY
Qty: 12 TABLET | Refills: 0 | Status: SHIPPED | OUTPATIENT
Start: 2020-02-25 | End: 2020-02-29

## 2020-02-25 RX ORDER — LIDOCAINE HYDROCHLORIDE 10 MG/ML
5 INJECTION, SOLUTION EPIDURAL; INFILTRATION; INTRACAUDAL; PERINEURAL ONCE
Status: COMPLETED | OUTPATIENT
Start: 2020-02-25 | End: 2020-02-25

## 2020-02-25 RX ORDER — ACETAMINOPHEN 325 MG/1
975 TABLET ORAL EVERY 8 HOURS SCHEDULED
Qty: 30 TABLET | Refills: 0 | Status: SHIPPED | OUTPATIENT
Start: 2020-02-25

## 2020-02-25 RX ORDER — OXYCODONE HYDROCHLORIDE 5 MG/1
5 TABLET ORAL EVERY 4 HOURS PRN
Qty: 20 TABLET | Refills: 0 | Status: SHIPPED | OUTPATIENT
Start: 2020-02-25 | End: 2020-03-06

## 2020-02-25 RX ORDER — KETOROLAC TROMETHAMINE 10 MG/1
10 TABLET, FILM COATED ORAL EVERY 6 HOURS PRN
Qty: 20 TABLET | Refills: 0 | Status: SHIPPED | OUTPATIENT
Start: 2020-02-25

## 2020-02-25 RX ORDER — KETOROLAC TROMETHAMINE 10 MG/1
10 TABLET, FILM COATED ORAL EVERY 6 HOURS PRN
Qty: 20 TABLET | Refills: 0 | Status: SHIPPED | OUTPATIENT
Start: 2020-02-25 | End: 2020-02-25 | Stop reason: SDUPTHER

## 2020-02-25 RX ADMIN — ACETAMINOPHEN 975 MG: 325 TABLET ORAL at 13:04

## 2020-02-25 RX ADMIN — KETOROLAC TROMETHAMINE 30 MG: 30 INJECTION, SOLUTION INTRAMUSCULAR; INTRAVENOUS at 05:58

## 2020-02-25 RX ADMIN — KETOROLAC TROMETHAMINE 30 MG: 30 INJECTION, SOLUTION INTRAMUSCULAR; INTRAVENOUS at 00:23

## 2020-02-25 RX ADMIN — ACETAMINOPHEN 975 MG: 325 TABLET ORAL at 05:58

## 2020-02-25 RX ADMIN — CEFAZOLIN SODIUM 1000 MG: 1 SOLUTION INTRAVENOUS at 07:31

## 2020-02-25 RX ADMIN — CEFAZOLIN SODIUM 1000 MG: 1 SOLUTION INTRAVENOUS at 00:23

## 2020-02-25 RX ADMIN — CLINDAMYCIN IN 5 PERCENT DEXTROSE 600 MG: 12 INJECTION, SOLUTION INTRAVENOUS at 08:31

## 2020-02-25 RX ADMIN — LIDOCAINE HYDROCHLORIDE 5 ML: 10 INJECTION, SOLUTION EPIDURAL; INFILTRATION; INTRACAUDAL; PERINEURAL at 11:40

## 2020-02-25 RX ADMIN — NICOTINE 1 PATCH: 21 PATCH, EXTENDED RELEASE TRANSDERMAL at 08:31

## 2020-02-25 RX ADMIN — CLINDAMYCIN IN 5 PERCENT DEXTROSE 600 MG: 12 INJECTION, SOLUTION INTRAVENOUS at 01:27

## 2020-02-25 RX ADMIN — ENOXAPARIN SODIUM 40 MG: 40 INJECTION SUBCUTANEOUS at 08:31

## 2020-02-25 RX ADMIN — KETOROLAC TROMETHAMINE 30 MG: 30 INJECTION, SOLUTION INTRAMUSCULAR; INTRAVENOUS at 11:40

## 2020-02-25 NOTE — ASSESSMENT & PLAN NOTE
· MRI foot/forefoot showed Localised fluid collection/abscess in the 4th toe at the level of the proximal phalanx measuring 1 7 x 1 3 x 2 5 cm  No associated septic arthritis or osteomyelitis  The adjacent extensor digitorum tendon is intact    · Patient underwent I&D by Podiatry on 02/22/2020  · Final cultures are positive for MRSA  · Wound care and dressing changes per Podiatry  · Adjusted pain regimen:  Patient expresses satisfaction with pain control  · Will discharge home on short course of oxycodone, bowel regimen as well as Tylenol

## 2020-02-25 NOTE — PLAN OF CARE
Problem: PAIN - ADULT  Goal: Verbalizes/displays adequate comfort level or baseline comfort level  Description  Interventions:  - Encourage patient to monitor pain and request assistance  - Assess pain using appropriate pain scale  - Administer analgesics based on type and severity of pain and evaluate response  - Implement non-pharmacological measures as appropriate and evaluate response  - Consider cultural and social influences on pain and pain management  - Notify physician/advanced practitioner if interventions unsuccessful or patient reports new pain  Outcome: Adequate for Discharge     Problem: INFECTION - ADULT  Goal: Absence or prevention of progression during hospitalization  Description  INTERVENTIONS:  - Assess and monitor for signs and symptoms of infection  - Monitor lab/diagnostic results  - Monitor all insertion sites, i e  indwelling lines, tubes, and drains  - Monitor endotracheal if appropriate and nasal secretions for changes in amount and color  - [place] appropriate cooling/warming therapies per order  - Administer medications as ordered  - Instruct and encourage patient and family to use good hand hygiene technique  - Identify and instruct in appropriate isolation precautions for identified infection/condition  Outcome: Adequate for Discharge  Goal: Absence of fever/infection during neutropenic period  Description  INTERVENTIONS:  - Monitor WBC    Outcome: Adequate for Discharge     Problem: Potential for Falls  Goal: Patient will remain free of falls  Description  INTERVENTIONS:  - Assess patient frequently for physical needs  -  Identify cognitive and physical deficits and behaviors that affect risk of falls    -  [place] fall precautions as indicated by assessment   - Educate patient/family on patient safety including physical limitations  - Instruct patient to call for assistance with activity based on assessment  - Modify environment to reduce risk of injury  - Consider OT/PT consult to assist with strengthening/mobility  Outcome: Adequate for Discharge

## 2020-02-25 NOTE — OCCUPATIONAL THERAPY NOTE
Occupational Therapy Note    Patient Name: Jennifer Peña  NKFJS'S Date: 2/25/2020    Pt on OT caseload  Spoke to [facility] who communicates to OT that pt is at modified independent level for ADLs, ambulation and transfers  Spoke to patient who confirms above  No further acute OT needs indicated at this time - Recommend pt continue to be OOB for meals, ambulation to/from BR, perform self care tasks, and mobility in hallway with nursing  D/C from OT caseload with above recommendations; recommend home with family support at time of d/c  Please reconsult OT if pt has a change in function indicating skilled OT needs during remainder of hospitalization      [place], MS, OTR/L

## 2020-02-25 NOTE — PROGRESS NOTES
Progress Note - Malik Lindquist 1990, 30 y o  male MRN: [id number]    Unit/Bed#: -01 Encounter: [id number]    Primary Care Provider: No primary care provider on file  Date and time admitted to hospital: 2/20/2020 12:48 PM        * Cellulitis of left foot  Assessment & Plan  · Patient with left foot cellulitis/abscess/tinia pedis  Was on Bactrim since Tuesday with worsening cellulitis  · IV antibiotics with Ancef and Clindamycin   · Elevate left lower extremity  · Podiatry and ID on board  · Patient is s/p deep tissue I+D yesterday   · Cultures growing MRSA  · Sensitivities reviewed-resistant to cefazolin so will discontinue  Susceptible to clindamycin  · Await Podiatry and ID input for discharge home and antibiotic choice as well as duration  Abscess of fourth toe of left foot  Assessment & Plan  · MRI foot/forefoot showed Localised fluid collection/abscess in the 4th toe at the level of the proximal phalanx measuring 1 7 x 1 3 x 2 5 cm  No associated septic arthritis or osteomyelitis  The adjacent extensor digitorum tendon is intact  · Patient underwent I&D by Podiatry on 02/22/2020  · Final cultures are positive for MRSA  · Wound care and dressing changes per Podiatry  · Adjusted pain regimen:  Patient expresses satisfaction with pain control  · Added scheduled Tylenol 975 mg every 8 hours  · Added scheduled Toradol IV 30 mg every 6 hours for maximum of 48 hours  · Discontinued Percocet  · PRN Oxycodone 5 mg every 4 hours as needed for moderate pain  · PRN Oxycodone 10 mg every 4 hours as needed for severe pain  · Continue PRN Morphine for breakthrough  · Continue IV antibiotics likely through today  · Will adjust pain medications further for short-course of treatment after discharge    Tobacco use  Assessment & Plan  · Encouraged cessation again today   · He will not be quitting he states  · Continue nicotine patch          VTE Pharmacologic Prophylaxis:   Pharmacologic: Enoxaparin (Lovenox)  Mechanical VTE Prophylaxis in Place: Yes    Patient Centered Rounds: I have performed bedside rounds with nursing staff today  Discussions with Specialists or Other Care Team Provider:  Primary RN    Education and Discussions with Family / Patient:  Patient    Time Spent for Care: 20 minutes  More than 50% of total time spent on counseling and coordination of care as described above  Current Length of Stay: 5 day(s)    Current Patient Status: Inpatient   Certification Statement: The patient will continue to require additional inpatient hospital stay due to Left foot abscess now growing MRSA bacteria    Discharge Plan / Estimated Discharge Date:  Possibly today or tomorrow      Code Status: Level 1 - Full Code      Subjective:   States his pain is well controlled  Offers no complaints of chest pain or shortness of breath  No nausea or vomiting diarrhea constipation  He is passing gas  He has had had a bowel movement  He is hoping to go home soon  He has had no fever or chills  Every time someone takes off the dressing, he thinks that the wound looks better and better  Objective:     Vitals:   Temp (24hrs), Av 2 °F (36 8 °C), Min:98 2 °F (36 8 °C), Max:98 3 °F (36 8 °C)    Temp:  [98 2 °F (36 8 °C)-98 3 °F (36 8 °C)] 98 2 °F (36 8 °C)  HR:  [68-84] 68  Resp:  [16-18] 18  BP: (111-137)/(70-93) 123/79  SpO2:  [95 %-98 %] 97 %  Body mass index is 35 97 kg/m²  Input and Output Summary (last 24 hours):     No intake or output data in the 24 hours ending 20 0941    Physical Exam:     Physical Exam   Constitutional: He is oriented to person, place, and time  He appears well-nourished  No distress  HENT:   Head: Normocephalic and atraumatic  Right Ear: External ear normal    Eyes: Pupils are equal, round, and reactive to light  EOM are normal    Neck: Normal range of motion  Neck supple  Cardiovascular: Normal rate and regular rhythm     Pulmonary/Chest: [place] normal and breath sounds normal    Abdominal: Soft  Bowel sounds are normal    Musculoskeletal: Normal range of motion  Neurological: He is alert and oriented to person, place, and time  Skin:   Left foot dressing intact   Psychiatric: He has a normal mood and affect  Very pleasant and talkative   Nursing note and vitals reviewed  Additional Data:     Labs:    Results from last 7 days   Lab Units 02/24/20  0507   WBC Thousand/uL 6 94   HEMOGLOBIN g/dL 14 9   HEMATOCRIT % 42 7   PLATELETS Thousands/uL 257   NEUTROS PCT % 45   LYMPHS PCT % 34   MONOS PCT % 13*   EOS PCT % 7*     Results from last 7 days   Lab Units 02/23/20  0434  02/21/20  0545   POTASSIUM mmol/L 4 3   < > 4 3   CHLORIDE mmol/L 102   < > 104   CO2 mmol/L 26   < > 28   BUN mg/dL 11   < > 10   CREATININE mg/dL 0 85   < > 0 99   CALCIUM mg/dL 8 9   < > 9 0   ALK PHOS U/L  --   --  51   ALT U/L  --   --  57   AST U/L  --   --  16    < > = values in this interval not displayed  * I Have Reviewed All Lab Data Listed Above  Wound cultures reviewed    Imaging:    Imaging Reports Reviewed Today Include:  MRI of the foot that was done on February 22nd revealed localized fluid collection abscess in the 4th toe at the level of the proximal phalanx however no septic arthritis or osteomyelitis was seen  Recent Cultures (last 7 days):     Results from last 7 days   Lab Units 02/22/20  0937 02/20/20  1750   BLOOD CULTURE   --  No Growth After 4 Days  No Growth After 4 Days     GRAM STAIN RESULT  No polys seen*  4+ Gram positive cocci in clusters*  --    WOUND CULTURE  4+ Growth of Methicillin Resistant Staphylococcus aureus*  --        Last 24 Hours Medication List:     Current Facility-Administered Medications:  acetaminophen 650 mg Oral Q6H PRN Halle Sables, DPM    acetaminophen 975 mg Oral [facility] Kristopher Chicas PA-C    cefazolin 1,000 mg Intravenous Q8H Halle Ryan DPOLEGARIO Last Rate: 1,000 mg (02/25/20 0731)   clindamycin 600 mg Intravenous Q8H Inell Crome, DPM Last Rate: 600 mg (02/25/20 0831)   enoxaparin 40 mg Subcutaneous Daily Inell Crome, DPM    ketorolac 30 mg Intravenous Q6H [facility], PA-C    miconazole  Topical BID Inell Crome, DPM    morphine injection 4 mg Intravenous Q4H PRN Rory Loots, PA-C    nicotine 1 patch Transdermal Daily Inell Crome, DPM    ondansetron 4 mg Intravenous Q6H PRN Inell Crome, DPM    oxyCODONE 10 mg Oral Q4H PRN Rory Loots, PA-C    oxyCODONE 5 mg Oral Q4H PRN Rory Loots, PA-C         Today, Patient Was Seen By: JULIO Azul    ** Please Note: Dragon 360 Dictation voice to text software may have been used in the creation of this document   **

## 2020-02-25 NOTE — DISCHARGE INSTR - AVS FIRST PAGE
Wound care: Scheduled for follow up at [place] [address] at 2:45 a m  For 1st bandage change post discharge from hospital   Keep bandage dry and do remove until you are seen Wednesday    You have any questions/problems please call Dr Jo Ann Gonsalves [phone]

## 2020-02-25 NOTE — PLAN OF CARE
Problem: PAIN - ADULT  Goal: Verbalizes/displays adequate comfort level or baseline comfort level  Description  Interventions:  - Encourage patient to monitor pain and request assistance  - Assess pain using appropriate pain scale  - Administer analgesics based on type and severity of pain and evaluate response  - Implement non-pharmacological measures as appropriate and evaluate response  - Consider cultural and social influences on pain and pain management  - Notify physician/advanced practitioner if interventions unsuccessful or patient reports new pain  Outcome: Progressing     Problem: INFECTION - ADULT  Goal: Absence or prevention of progression during hospitalization  Description  INTERVENTIONS:  - Assess and monitor for signs and symptoms of infection  - Monitor lab/diagnostic results  - Monitor all insertion sites, i e  indwelling lines, tubes, and drains  - Monitor endotracheal if appropriate and nasal secretions for changes in amount and color  - [place] appropriate cooling/warming therapies per order  - Administer medications as ordered  - Instruct and encourage patient and family to use good hand hygiene technique  - Identify and instruct in appropriate isolation precautions for identified infection/condition  Outcome: Progressing  Goal: Absence of fever/infection during neutropenic period  Description  INTERVENTIONS:  - Monitor WBC    Outcome: Progressing     Problem: Potential for Falls  Goal: Patient will remain free of falls  Description  INTERVENTIONS:  - Assess patient frequently for physical needs  -  Identify cognitive and physical deficits and behaviors that affect risk of falls    -  [place] fall precautions as indicated by assessment   - Educate patient/family on patient safety including physical limitations  - Instruct patient to call for assistance with activity based on assessment  - Modify environment to reduce risk of injury  - Consider OT/PT consult to assist with strengthening/mobility  Outcome: Progressing

## 2020-02-25 NOTE — ASSESSMENT & PLAN NOTE
· MRI foot/forefoot showed Localised fluid collection/abscess in the 4th toe at the level of the proximal phalanx measuring 1 7 x 1 3 x 2 5 cm  No associated septic arthritis or osteomyelitis  The adjacent extensor digitorum tendon is intact  · Patient underwent I&D by Podiatry on 02/22/2020  · Final cultures are positive for MRSA  · Wound care and dressing changes per Podiatry  · Adjusted pain regimen:  Patient expresses satisfaction with pain control  · Added scheduled Tylenol 975 mg every 8 hours  · Added scheduled Toradol IV 30 mg every 6 hours for maximum of 48 hours  · Discontinued Percocet  · PRN Oxycodone 5 mg every 4 hours as needed for moderate pain  · PRN Oxycodone 10 mg every 4 hours as needed for severe pain  · Continue PRN Morphine for breakthrough    · Continue IV antibiotics likely through today  · Will adjust pain medications further for short-course of treatment after discharge

## 2020-02-25 NOTE — ASSESSMENT & PLAN NOTE
· Patient with left foot cellulitis/abscess/tinia pedis  Was on Bactrim since Tuesday with worsening cellulitis  · IV antibiotics with Ancef and Clindamycin--> wound cultures positive for MRSA  Patient will be discharged home on Bactrim TID at the recommendation of Dr Brooks Sandoval, discussed with her today  And discussed with Dr To Willett earlier today  He cleared patient for home as well     · Elevate left lower extremity  · Has Podiatry follow-up tomorrow 1445 hours

## 2020-02-25 NOTE — ASSESSMENT & PLAN NOTE
· Patient with left foot cellulitis/abscess/tinia pedis  Was on Bactrim since Tuesday with worsening cellulitis  · IV antibiotics with Ancef and Clindamycin   · Elevate left lower extremity  · Podiatry and ID on board  · Patient is s/p deep tissue I+D yesterday   · Cultures growing MRSA  · Sensitivities reviewed-resistant to cefazolin so will discontinue  Susceptible to clindamycin  · Await Podiatry and ID input for discharge home and antibiotic choice as well as duration

## 2020-02-25 NOTE — SOCIAL WORK
Patient requesting crutches at discharge  Dr Juan C Sousa wrote script  Crutches obtained for patient

## 2020-02-25 NOTE — ASSESSMENT & PLAN NOTE
· Encouraged cessation again today   · He will not be quitting he states  · Continue nicotine patch

## 2020-02-25 NOTE — PROGRESS NOTES
Progress Note - Podiatry  Aquiles Morales 30 y o  male MRN: [id number]  Unit/Bed#: -01 Encounter: [id number]    Assessment/Plan:  Abscess left foot/4th toe  Cellulitis left foot/leg   -postop day #3 S/P I&D deep abscess left foot/4th Toe   -OR C&S growing MRSA sensitive to Bactrim and Clindamycin, discussed with Infectious Disease    -wound irrigated with 200 cc normal saline solution and packed with 1 5" of quarter-inch iodoform    -Ok for discharge today on appropriate antibiotics per Infectious Disease    -follow-up tomorrow at wound care    Subjective/Objective   Chief Complaint:   Chief Complaint   Patient presents with    Foot Swelling     Pt with left foot swelling and pain since friday 2/14/20, denies injury  Was seen [facility] on 2/18/20 and is here today for continued swelling and pain  Has been on naproxyn and bactrim since 2/18/20  Subjective:  73-year-old white male S/P I&D LFT seen today PO Day #3  Relates having much less pain overnight  Anxious to go today, concerned somewhat with the resistant bacteria that grown  Denies any fever or increasing pain/discomfort  Blood pressure 123/79, pulse 68, temperature 98 2 °F (36 8 °C), resp  rate 18, height 5' 6" (1 676 m), weight 101 kg (222 lb 14 2 oz), SpO2 97 %  ,Body mass index is 35 97 kg/m²  Invasive Devices     Peripheral Intravenous Line            Peripheral IV 02/24/20 Right;Ventral (anterior) Wrist 1 day                Physical Exam:   General appearance: alert, cooperative and no distress  Neuro/Vascular:  Grossly intact with palpable pedal pulses and normal sensation  Extremity:  Surgical dressing intact to left foot no strike through drainage noted  Surgical incision overall unchanged from yesterday with superficial slough/shedding of necrotic skin seems to have completed it is demarcation  No further active necrosis noted  Erythema, edema, and calor of the forefoot have significantly diminished from yesterday    Some pain on the dorsal lateral aspect of the foot onto the ankle remains most likely secondary to cellulitis  Labs and other studies:   CBC w/diff  Results from last 7 days   Lab Units 02/24/20  0507   WBC Thousand/uL 6 94   HEMOGLOBIN g/dL 14 9   HEMATOCRIT % 42 7   PLATELETS Thousands/uL 257   NEUTROS PCT % 45   LYMPHS PCT % 34   MONOS PCT % 13*   EOS PCT % 7*     BMP  Results from last 7 days   Lab Units 02/23/20  0434   POTASSIUM mmol/L 4 3   CHLORIDE mmol/L 102   CO2 mmol/L 26   BUN mg/dL 11   CREATININE mg/dL 0 85   CALCIUM mg/dL 8 9     CMP  Results from last 7 days   Lab Units 02/23/20  0434  02/21/20  0545   POTASSIUM mmol/L 4 3   < > 4 3   CHLORIDE mmol/L 102   < > 104   CO2 mmol/L 26   < > 28   BUN mg/dL 11   < > 10   CREATININE mg/dL 0 85   < > 0 99   CALCIUM mg/dL 8 9   < > 9 0   ALK PHOS U/L  --   --  51   ALT U/L  --   --  57   AST U/L  --   --  16    < > = values in this interval not displayed  @Culture@  Lab Results   Component Value Date    BLOODCX No Growth After 4 Days  02/20/2020    BLOODCX No Growth After 4 Days   02/20/2020         Current Facility-Administered Medications:     acetaminophen (TYLENOL) tablet 650 mg, 650 mg, Oral, Q6H PRN, Alvera Hinders, DPM, 650 mg at 02/20/20 1935    acetaminophen (TYLENOL) tablet 975 mg, 975 mg, Oral, Q8H [facility], Brenda Dougherty PA-C, 975 mg at 02/25/20 0558    ceFAZolin (ANCEF) IVPB (premix) 1,000 mg, 1,000 mg, Intravenous, Q8H, Alvera Hinders, DPM, Last Rate: 100 mL/hr at 02/25/20 0731, 1,000 mg at 02/25/20 0731    clindamycin (CLEOCIN) IVPB (premix) 600 mg, 600 mg, Intravenous, Q8H, Alvera Hinders, DPM, Last Rate: 100 mL/hr at 02/25/20 0831, 600 mg at 02/25/20 0831    enoxaparin (LOVENOX) subcutaneous injection 40 mg, 40 mg, Subcutaneous, Daily, Nilo Harden, BALJINDER, 40 mg at 02/25/20 0831    ketorolac (TORADOL) injection 30 mg, 30 mg, Intravenous, Q6H [facility], Brenda Dougherty PA-C, 30 mg at 02/25/20 0558    miconazole 2 % cream, , Topical, BID, Love Fernandez DPM    morphine (PF) 4 mg/mL injection 4 mg, 4 mg, Intravenous, Q4H PRN, Sue Diaz PA-C, 4 mg at 02/24/20 1701    nicotine (NICODERM CQ) 21 mg/24 hr TD 24 hr patch 1 patch, 1 patch, Transdermal, Daily, Love Fernandez DPM, 1 patch at 02/25/20 0831    ondansetron (ZOFRAN) injection 4 mg, 4 mg, Intravenous, Q6H PRN, Love Fernandez DPM    oxyCODONE (ROXICODONE) IR tablet 10 mg, 10 mg, Oral, Q4H PRN, JAGDISH Catalan-ZITA    oxyCODONE (ROXICODONE) IR tablet 5 mg, 5 mg, Oral, Q4H PRN, Sue Diaz PA-C, 5 mg at 02/24/20 0509    Imaging: I have personally reviewed pertinent films in PACS  EKG, Pathology, and Other Studies: I have personally reviewed pertinent reports    VTE Pharmacologic Prophylaxis: Enoxaparin (Lovenox)  VTE Mechanical Prophylaxis: sequential compression device    John Collado DPM

## 2020-02-25 NOTE — PROGRESS NOTES
Elan Alvarez  30 y o   male  1990  mrn [id number]    Assessment/Plan:  1  Left foot cellulitis/MRSA left 4th toe abscess/Tinea pedis: Remains afebrile and WBC count is nml post-op  MRI of left foot showed abscess by left 4th toe but no osteo  He underwent I & D of left 4th toe abscess by Podiatry  OR cx is positive for MRSA which is susceptible to Clinda and Bactrim   Bld cx's are neg  His left foot has markedly improved following I & D  Cellulitis is resolving       Pt presented with increasing redness, swelling, and pain of his left foot, especially involving his left 4th toe c/w cellulitis  His underlying tinea pedis is the etiology for his current cellulitis since he was scratching btw his toes  He did not have fever or elevated WBC count  He took Bactrim x 2 days without significant improvement       A  OK to D/C Pt today on Bactrim DS 1 po TID and cont through 3/3/20 - Pt has some Bactrim at home - would give Pt a script for an additional 4 more days of Bactrim  B  Cont to keep left foot elevated as much as possible  C  Cont local wound care as per Podiatry  D  Cont topical tx of tinea pedis  E  Pt will f/u with me as an out-Pt       Subjective: Feels better  Much less left foot pain    Objective:  Tmax: 98 4  Lungs: Clear  Abd: +BS, soft, nontender  Ext: +Intact dressing on left foot    Labs:  CBC w/diff  Recent Labs     02/24/20  0507   WBC 6 94   HGB 14 9   HCT 42 7      NEUTOPHILPCT 45   LYMPHOPCT 34   MONOPCT 13*   EOSPCT 7*     BMP  Recent Labs     02/23/20  0434   K 4 3      CO2 26   BUN 11   CREATININE 0 85   CALCIUM 8 9     CMP  Recent Labs     02/23/20  0434   K 4 3      CO2 26   BUN 11   CREATININE 0 85   CALCIUM 8 9        labrc    Cultures:  Lab Results   Component Value Date    BLOODCX No Growth After 4 Days  02/20/2020    BLOODCX No Growth After 4 Days   02/20/2020     Lab Results   Component Value Date    WOUNDCULT (A) 02/22/2020     4+ Growth of Methicillin Resistant Staphylococcus aureus     No results found for: URINECX  No results found for: SPUTUMCULTUR    MED:  Ancef: #6  Clinda: #6  Abx: #8         Current Facility-Administered Medications:     acetaminophen (TYLENOL) tablet 650 mg, 650 mg, Oral, Q6H PRN, Cerna Jose, DPM, 650 mg at 02/20/20 1935    acetaminophen (TYLENOL) tablet 975 mg, 975 mg, Oral, Q8H [address], Beverlie Honey, PA-C, 975 mg at 02/25/20 1304    ceFAZolin (ANCEF) IVPB (premix) 1,000 mg, 1,000 mg, Intravenous, Q8H, Cerna Jose, DPM, Last Rate: 100 mL/hr at 02/25/20 0731, 1,000 mg at 02/25/20 0731    clindamycin (CLEOCIN) IVPB (premix) 600 mg, 600 mg, Intravenous, Q8H, Cerna Jose, DPM, Last Rate: 100 mL/hr at 02/25/20 0831, 600 mg at 02/25/20 0831    enoxaparin (LOVENOX) subcutaneous injection 40 mg, 40 mg, Subcutaneous, Daily, Cerna Jose, DPM, 40 mg at 02/25/20 0831    ketorolac (TORADOL) injection 30 mg, 30 mg, Intravenous, Q6H [address], Beverlie Honey, PA-C, 30 mg at 02/25/20 1140    miconazole 2 % cream, , Topical, BID, Cerna Jose, DPM    morphine (PF) 4 mg/mL injection 4 mg, 4 mg, Intravenous, Q4H PRN, Beverlie Honey, PA-C, 4 mg at 02/24/20 1701    nicotine (NICODERM CQ) 21 mg/24 hr TD 24 hr patch 1 patch, 1 patch, Transdermal, Daily, Nehemiah Lopeza, DPM, 1 patch at 02/25/20 0831    ondansetron (ZOFRAN) injection 4 mg, 4 mg, Intravenous, Q6H PRN, Cerna Jose, DPM    oxyCODONE (ROXICODONE) IR tablet 10 mg, 10 mg, Oral, Q4H PRN, Catalie Honey, PA-C    oxyCODONE (ROXICODONE) IR tablet 5 mg, 5 mg, Oral, Q4H PRN, Praveen Duff PA-C, 5 mg at 02/24/20 3659    Principal Problem:    Cellulitis of left foot  Active Problems:    Tobacco use    Abscess of fourth toe of left foot      Fanny Funes MD

## 2020-02-25 NOTE — DISCHARGE SUMMARY
Discharge- Bruce Pin 1990, 30 y o  male MRN: [id number]    Unit/Bed#: -01 Encounter: [id number]    Primary Care Provider: No primary care provider on file  Date and time admitted to hospital: 2/20/2020 12:48 PM        * Cellulitis of left foot  Assessment & Plan  · Patient with left foot cellulitis/abscess/tinia pedis  Was on Bactrim since Tuesday with worsening cellulitis  · IV antibiotics with Ancef and Clindamycin--> wound cultures positive for MRSA  Patient will be discharged home on Bactrim TID at the recommendation of Dr Dana Vargas, discussed with her today  And discussed with Dr Lizzy Meyer earlier today  He cleared patient for home as well  · Elevate left lower extremity  · Has Podiatry follow-up tomorrow 1445 hours    Abscess of fourth toe of left foot  Assessment & Plan  · MRI foot/forefoot showed Localised fluid collection/abscess in the 4th toe at the level of the proximal phalanx measuring 1 7 x 1 3 x 2 5 cm  No associated septic arthritis or osteomyelitis  The adjacent extensor digitorum tendon is intact  · Patient underwent I&D by Podiatry on 02/22/2020  · Final cultures are positive for MRSA  · Wound care and dressing changes per Podiatry  · Adjusted pain regimen:  Patient expresses satisfaction with pain control  · Will discharge home on short course of oxycodone, bowel regimen as well as Tylenol    Tobacco use  Assessment & Plan  · Encouraged cessation again today   · He will not be quitting he states  · Continue nicotine patch  Discharging Physician / Practitioner: JULIO Melendez  PCP: No primary care provider on file    Admission Date:   Admission Orders (From admission, onward)     Ordered        02/20/20 1447  Inpatient Admission (expected length of stay for this patient Order details is greater than two midnights)  Once                   Discharge Date: 02/25/20    Resolved Problems  Date Reviewed: 2/25/2020    None          Consultations During [facility] Stay:  · ID  · Podiatry     Procedures Performed:   · I+D with wash out on 2/22/2020--> +MRSA     Significant Findings / Test Results:   · MRSA toe abscess     Incidental Findings:   · none    Test Results Pending at Discharge (will require follow up):   · none     Outpatient Tests Requested:  · none    Complications:  none    Reason for Admission:  Toe abscess    Hospital Course:     Marizol Fernandez is a 30 y o  male patient who originally presented to the hospital on 2/20/2020 due to left 4th toe abscess  It started with tinea pedis  He states he picked some skin and then it became very inflamed, painful and he could barely walk  He came to the hospital and an abscess was diagnosed  Podiatry was consulted and they took him for an I&D and washout  The podiatrist states there was large amounts of pus extricated from the toe  There is no evidence of osteomyelitis thankfully  Patient did grow MRSA and was placed on antibiotics for discharge  It is susceptible to clindamycin and Bactrim  He has been on IV clindamycin since admission  He is afebrile and his pain is much better  He will be discharged home on a short course of oxycodone 5 mg tablets with no refills  He will continue Bactrim as prior to admission, finish that prescription as well as the additional prescription that he is leaving with  Please see above list of diagnoses and related plan for additional information  Condition at Discharge: stable     Discharge Day Visit / Exam:     * Please refer to separate progress note for these details *    Discussion with Family:  Offered, patient declined    Discharge instructions/Information to patient and family:   See after visit summary for information provided to patient and family  Provisions for Follow-Up Care:  See after visit summary for information related to follow-up care and any pertinent home health orders        Disposition:     Home    For Discharges to [facility]: · Not Applicable to this Patient - Not Applicable to this Patient    Planned Readmission:  Not anticipated     Discharge Statement:  I spent 35 minutes discharging the patient and discussing the case with podiatry, infectious disease and case management  This time was spent on the day of discharge  I had direct contact with the patient on the day of discharge  Greater than 50% of the total time was spent examining patient, answering all patient questions, arranging and discussing plan of care with patient as well as directly providing post-discharge instructions  Additional time then spent on discharge activities  Discharge Medications:  See after visit summary for reconciled discharge medications provided to patient and family        ** Please Note: This note has been constructed using a voice recognition system **

## 2020-02-26 ENCOUNTER — APPOINTMENT (OUTPATIENT)
Dept: WOUND CARE | Facility: HOSPITAL | Age: 30
End: 2020-02-26
Payer: COMMERCIAL

## 2020-02-26 VITALS
RESPIRATION RATE: 18 BRPM | DIASTOLIC BLOOD PRESSURE: 79 MMHG | HEIGHT: 66 IN | OXYGEN SATURATION: 96 % | WEIGHT: 222.88 LBS | TEMPERATURE: 98.2 F | SYSTOLIC BLOOD PRESSURE: 123 MMHG | HEART RATE: 68 BPM | BODY MASS INDEX: 35.82 KG/M2

## 2020-02-26 PROCEDURE — 99213 OFFICE O/P EST LOW 20 MIN: CPT

## 2020-02-26 NOTE — UTILIZATION REVIEW
To: Dr Juan Alberto Beltrán, Medical Director    From:  Dr Liz Bell, Physician Advisor      Additional clinical as requested from P2P:    2/25/20 Infectious Disease note:    Assessment/Plan:  1  Left foot cellulitis/MRSA left 4th toe abscess/Tinea pedis: Remains afebrile and WBC count is nml post-op  MRI of left foot showed abscess by left 4th toe but no osteo  He underwent I & D of left 4th toe abscess by Podiatry  OR cx is positive for MRSA which is susceptible to Clinda and Bactrim   Bld cx's are neg  His left foot has markedly improved following I & D  Cellulitis is resolving       Pt presented with increasing redness, swelling, and pain of his left foot, especially involving his left 4th toe c/w cellulitis  His underlying tinea pedis is the etiology for his current cellulitis since he was scratching btw his toes  He did not have fever or elevated WBC count  He took Bactrim x 2 days without significant improvement       A  OK to D/C Pt today on Bactrim DS 1 po TID and cont through 3/3/20 - Pt has some Bactrim at home - would give Pt a script for an additional 4 more days of Bactrim  B  Cont to keep left foot elevated as much as possible  C  Cont local wound care as per Podiatry  D  Cont topical tx of tinea pedis  E  Pt will f/u with me as an out-Pt      2/24/20 Infectious Disease note:      Assessment/Plan:  1  Left foot cellulitis/Staph aureus left 4th toe abscess/Tinea pedis: No fever and WBC count is nml post-op  MRI of left foot showed abscess by left 4th toe but no osteo  He underwent I & D of left 4th toe abscess by Podiatry  OR cx is positive for Staph aureus  Bld cx's are neg       Pt presented with increasing redness, swelling, and pain of his left foot, especially involving his left 4th toe c/w cellulitis  His underlying tinea pedis is the etiology for his current cellulitis since he was scratching btw his toes  Most likely due to Strep or Staph species  He did not have fever or elevated WBC count   He took Bactrim x 2 days without significant improvement       A  Cont Ancef and Clinda for now    B  Awaiting final results of OR cx - will re-adjust abx according to the susceptibility results  C  Cont to keep left foot elevated as much as possible  D  Cont local wound care as per Podiatry  E  Cont topical tx of tinea pedis       Pain Medication:     Medications 02/20 02/21 02/22 02/23 02/24 02/25   acetaminophen (TYLENOL) tablet 650 mg   Dose: 650 mg  Freq: Every 6 hours PRN Route: PO  PRNReason: fever  Start: 02/20/20 1559 End: 02/25/20 1923    1935                      morphine (PF) 4 mg/mL injection 4 mg   Dose: 4 mg  Freq: Every 4 hours PRN Route: IV  PRN Reason: breakthrough pain  Start: 02/24/20 1142 End: 02/25/20 1923    Admin Instructions:   High alert medication  LOOK ALIKE SOUND ALIKE MED        1701            morphine (PF) 4 mg/mL injection 4 mg   Dose: 4 mg  Freq: Every 4 hours PRN Route: IV  PRN Reason: severe pain  Start: 02/22/20 1715 End: 02/24/20 1144         1846     2254      0346     0754     1216     1326     1623     2200      0223     0753            morphine (PF) 4 mg/mL injection 4 mg   Dose: 4 mg  Freq: Every 4 hours PRN Route: IV  PRN Reason: severe pain  Start: 02/21/20 0402 End: 02/21/20 1357    Admin Instructions:   High alert medication  LOOK ALIKE SOUND ALIProcess Data Control MED     8827     1342     1357-D/C'd          morphine injection 2 mg   Dose: 2 mg  Freq: Every 3 hours PRN Route: IV  PRN Reason: severe pain  Start: 02/21/20 1400 End: 02/22/20 1712    Admin Instructions:   High alert medication   LOOK ALIKE SOUND ALIProcess Data Control MED     6118      5636             9629     1712-D/C'd         morphine injection 2 mg   Dose: 2 mg  Freq: Every 4 hours PRN Route: IV  PRN Reason: moderate pain  PRN Comment: breakthrough pain  Start: 02/21/20 0359 End: 02/22/20 1413    Admin Instructions:     2551     9994            1413-D/C'd         oxyCODONE-acetaminophen (PERCOCET) 5-325 mg per tablet 1 tablet   Dose: 1 tablet  Freq: Every 6 hours PRN Route: PO  PRN Reason: moderate pain  Start: 02/22/20 1400 End: 02/24/20 1144    Admin Instructions:   High alert medication   LOOK ALIKE SOUND ALIKE MED      1416       1144-D/C'd       Medications 02/20 02/21 02/22 02/23 02/24 02/25             ketorolac (TORADOL) injection 30 mg   Dose: 30 mg  Freq: Every 6 hours scheduled Route: IV  Start: 02/24/20 1145 End: 02/25/20 3409    [address] [place], [place] [id number]     1923-D/C'd

## 2020-02-26 NOTE — PHYSICAL THERAPY NOTE
PT tx  Pt seen for gait training with rw wbat Lle  Step hop yehuda but able to manage for level surfaces 25'  Cautioned to slow down and use RW instead of Iv pole  Hoping for d/c soon     [address]

## 2020-02-26 NOTE — UTILIZATION REVIEW
Notification of Discharge  This is a Notification of Discharge from our facility [address]  Please be advised that this patient has been discharge from our facility  Below you will find the admission and discharge date and time including the patients disposition  PRESENTATION DATE: 2/20/2020 12:48 PM  OBS ADMISSION DATE:   IP ADMISSION DATE: 2/20/20 1447   DISCHARGE DATE: 2/25/2020  5:18 PM  DISPOSITION: Home/Self Care Home/Self Care   Admission Orders listed below:  Admission Orders (From admission, onward)     Ordered        02/20/20 1447  Inpatient Admission (expected length of stay for this patient Order details is greater than two midnights)  Once                   Please contact the UR Department if additional information is required to close this patient's authorization/case  Shari Arnold  [facility] Utilization Review Department  Main: [phone] x carefully listen to the prompts  All voicemails are confidential   [email or web address]  Send all requests for admission clinical reviews, approved or denied determinations and any other requests to dedicated fax number below belonging to the campus where the patient is receiving treatment   List of dedicated fax numbers:  [address] DENIALS (Administrative/Medical Necessity) [phone]   [address]  (Maternity/NICU/Pediatrics) [phone]   [facility] [phone]   [facility] [phone]   [facility] [phone]   [address] [facility] [address] [facility] [phone]   [facility]  [phone]   [address] [facility] [address]  [address] [facility] Main [address] [facility] [phone]

## 2020-02-28 ENCOUNTER — APPOINTMENT (OUTPATIENT)
Dept: WOUND CARE | Facility: HOSPITAL | Age: 30
End: 2020-02-28
Payer: COMMERCIAL

## 2020-02-28 PROCEDURE — 99212 OFFICE O/P EST SF 10 MIN: CPT

## 2020-03-02 ENCOUNTER — APPOINTMENT (OUTPATIENT)
Dept: WOUND CARE | Facility: HOSPITAL | Age: 30
End: 2020-03-02
Payer: COMMERCIAL

## 2020-03-02 PROCEDURE — 99211 OFF/OP EST MAY X REQ PHY/QHP: CPT

## 2020-03-04 ENCOUNTER — APPOINTMENT (OUTPATIENT)
Dept: WOUND CARE | Facility: HOSPITAL | Age: 30
End: 2020-03-04
Payer: COMMERCIAL

## 2020-03-04 PROCEDURE — 11042 DBRDMT SUBQ TIS 1ST 20SQCM/<: CPT

## 2020-03-06 ENCOUNTER — APPOINTMENT (OUTPATIENT)
Dept: WOUND CARE | Facility: HOSPITAL | Age: 30
End: 2020-03-06
Payer: COMMERCIAL

## 2020-03-06 PROCEDURE — 99211 OFF/OP EST MAY X REQ PHY/QHP: CPT

## 2020-03-09 ENCOUNTER — APPOINTMENT (OUTPATIENT)
Dept: WOUND CARE | Facility: HOSPITAL | Age: 30
End: 2020-03-09
Payer: COMMERCIAL

## 2020-03-09 PROCEDURE — 99211 OFF/OP EST MAY X REQ PHY/QHP: CPT

## 2020-03-11 ENCOUNTER — APPOINTMENT (OUTPATIENT)
Dept: WOUND CARE | Facility: HOSPITAL | Age: 30
End: 2020-03-11
Payer: COMMERCIAL

## 2020-03-11 PROCEDURE — 11042 DBRDMT SUBQ TIS 1ST 20SQCM/<: CPT

## 2020-03-18 ENCOUNTER — APPOINTMENT (OUTPATIENT)
Dept: WOUND CARE | Facility: HOSPITAL | Age: 30
End: 2020-03-18
Payer: COMMERCIAL

## 2020-03-18 PROCEDURE — 11042 DBRDMT SUBQ TIS 1ST 20SQCM/<: CPT

## 2020-03-24 ENCOUNTER — APPOINTMENT (OUTPATIENT)
Dept: WOUND CARE | Facility: HOSPITAL | Age: 30
End: 2020-03-24
Payer: COMMERCIAL

## 2020-03-24 PROCEDURE — 11042 DBRDMT SUBQ TIS 1ST 20SQCM/<: CPT

## 2021-04-14 ENCOUNTER — APPOINTMENT (EMERGENCY)
Dept: RADIOLOGY | Facility: HOSPITAL | Age: 31
End: 2021-04-14
Payer: COMMERCIAL

## 2021-04-14 ENCOUNTER — HOSPITAL ENCOUNTER (EMERGENCY)
Facility: HOSPITAL | Age: 31
Discharge: HOME | End: 2021-04-15
Attending: EMERGENCY MEDICINE | Admitting: EMERGENCY MEDICINE
Payer: COMMERCIAL

## 2021-04-14 DIAGNOSIS — R07.89 ATYPICAL CHEST PAIN: Primary | ICD-10-CM

## 2021-04-14 LAB
ALBUMIN SERPL BCP-MCNC: 3.9 G/DL (ref 3.5–5)
ALP SERPL-CCNC: 59 U/L (ref 46–116)
ALT SERPL W P-5'-P-CCNC: 261 U/L (ref 12–78)
ANION GAP SERPL CALCULATED.3IONS-SCNC: 10 MMOL/L (ref 4–13)
APTT PPP: 29 SECONDS (ref 23–37)
AST SERPL W P-5'-P-CCNC: 49 U/L (ref 5–45)
BASOPHILS # BLD AUTO: 0.05 THOUSANDS/ΜL (ref 0–0.1)
BASOPHILS NFR BLD AUTO: 1 % (ref 0–1)
BILIRUB SERPL-MCNC: 0.5 MG/DL (ref 0.2–1)
BUN SERPL-MCNC: 9 MG/DL (ref 5–25)
CALCIUM SERPL-MCNC: 9.5 MG/DL (ref 8.3–10.1)
CHLORIDE SERPL-SCNC: 101 MMOL/L (ref 100–108)
CO2 SERPL-SCNC: 30 MMOL/L (ref 21–32)
CREAT SERPL-MCNC: 0.89 MG/DL (ref 0.6–1.3)
D DIMER PPP FEU-MCNC: <0.27 UG/ML FEU
EOSINOPHIL # BLD AUTO: 0.46 THOUSAND/ΜL (ref 0–0.61)
EOSINOPHIL NFR BLD AUTO: 4 % (ref 0–6)
ERYTHROCYTE [DISTWIDTH] IN BLOOD BY AUTOMATED COUNT: 12.9 % (ref 11.6–15.1)
GFR SERPL CREATININE-BSD FRML MDRD: 112 ML/MIN/1.73SQ M
GLUCOSE SERPL-MCNC: 107 MG/DL (ref 65–140)
HCT VFR BLD AUTO: 44.4 % (ref 36.5–49.3)
HGB BLD-MCNC: 15.5 G/DL (ref 12–17)
IMM GRANULOCYTES # BLD AUTO: 0.04 THOUSAND/UL (ref 0–0.2)
IMM GRANULOCYTES NFR BLD AUTO: 0 % (ref 0–2)
INR PPP: 0.92 (ref 0.84–1.19)
LIPASE SERPL-CCNC: 229 U/L (ref 73–393)
LYMPHOCYTES # BLD AUTO: 3.23 THOUSANDS/ΜL (ref 0.6–4.47)
LYMPHOCYTES NFR BLD AUTO: 29 % (ref 14–44)
MCH RBC QN AUTO: 31.6 PG (ref 26.8–34.3)
MCHC RBC AUTO-ENTMCNC: 34.9 G/DL (ref 31.4–37.4)
MCV RBC AUTO: 91 FL (ref 82–98)
MONOCYTES # BLD AUTO: 1.25 THOUSAND/ΜL (ref 0.17–1.22)
MONOCYTES NFR BLD AUTO: 11 % (ref 4–12)
NEUTROPHILS # BLD AUTO: 5.94 THOUSANDS/ΜL (ref 1.85–7.62)
NEUTS SEG NFR BLD AUTO: 55 % (ref 43–75)
NRBC BLD AUTO-RTO: 0 /100 WBCS
PLATELET # BLD AUTO: 253 THOUSANDS/UL (ref 149–390)
PMV BLD AUTO: 9.5 FL (ref 8.9–12.7)
POTASSIUM SERPL-SCNC: 4.1 MMOL/L (ref 3.5–5.3)
PROT SERPL-MCNC: 7.7 G/DL (ref 6.4–8.2)
PROTHROMBIN TIME: 12.4 SECONDS (ref 11.6–14.5)
RBC # BLD AUTO: 4.9 MILLION/UL (ref 3.88–5.62)
SODIUM SERPL-SCNC: 141 MMOL/L (ref 136–145)
TROPONIN I SERPL-MCNC: <0.02 NG/ML
WBC # BLD AUTO: 10.97 THOUSAND/UL (ref 4.31–10.16)

## 2021-04-14 PROCEDURE — 85025 COMPLETE CBC W/AUTO DIFF WBC: CPT

## 2021-04-14 PROCEDURE — 0241U HB NFCT DS VIR RESP RNA 4 TRGT: CPT | Performed by: EMERGENCY MEDICINE

## 2021-04-14 PROCEDURE — 36415 COLL VENOUS BLD VENIPUNCTURE: CPT

## 2021-04-14 PROCEDURE — 99285 EMERGENCY DEPT VISIT HI MDM: CPT

## 2021-04-14 PROCEDURE — 80053 COMPREHEN METABOLIC PANEL: CPT

## 2021-04-14 PROCEDURE — 96374 THER/PROPH/DIAG INJ IV PUSH: CPT

## 2021-04-14 PROCEDURE — 99284 EMERGENCY DEPT VISIT MOD MDM: CPT | Performed by: EMERGENCY MEDICINE

## 2021-04-14 PROCEDURE — 84484 ASSAY OF TROPONIN QUANT: CPT

## 2021-04-14 PROCEDURE — 85730 THROMBOPLASTIN TIME PARTIAL: CPT | Performed by: EMERGENCY MEDICINE

## 2021-04-14 PROCEDURE — 83690 ASSAY OF LIPASE: CPT | Performed by: EMERGENCY MEDICINE

## 2021-04-14 PROCEDURE — 93005 ELECTROCARDIOGRAM TRACING: CPT

## 2021-04-14 PROCEDURE — 71045 X-RAY EXAM CHEST 1 VIEW: CPT

## 2021-04-14 PROCEDURE — 85379 FIBRIN DEGRADATION QUANT: CPT | Performed by: EMERGENCY MEDICINE

## 2021-04-14 PROCEDURE — 85610 PROTHROMBIN TIME: CPT | Performed by: EMERGENCY MEDICINE

## 2021-04-14 RX ORDER — MAGNESIUM HYDROXIDE/ALUMINUM HYDROXICE/SIMETHICONE 120; 1200; 1200 MG/30ML; MG/30ML; MG/30ML
30 SUSPENSION ORAL ONCE
Status: COMPLETED | OUTPATIENT
Start: 2021-04-14 | End: 2021-04-14

## 2021-04-14 RX ORDER — LIDOCAINE HYDROCHLORIDE 20 MG/ML
15 SOLUTION OROPHARYNGEAL ONCE
Status: COMPLETED | OUTPATIENT
Start: 2021-04-14 | End: 2021-04-14

## 2021-04-14 RX ADMIN — ALUMINUM HYDROXIDE, MAGNESIUM HYDROXIDE, AND SIMETHICONE 30 ML: 200; 200; 20 SUSPENSION ORAL at 23:27

## 2021-04-14 RX ADMIN — LIDOCAINE HYDROCHLORIDE 15 ML: 20 SOLUTION ORAL; TOPICAL at 23:28

## 2021-04-14 RX ADMIN — FAMOTIDINE 20 MG: 10 INJECTION INTRAVENOUS at 23:30

## 2021-04-15 VITALS
WEIGHT: 215 LBS | DIASTOLIC BLOOD PRESSURE: 65 MMHG | HEIGHT: 66 IN | TEMPERATURE: 98.1 F | SYSTOLIC BLOOD PRESSURE: 124 MMHG | BODY MASS INDEX: 34.55 KG/M2 | OXYGEN SATURATION: 98 % | RESPIRATION RATE: 20 BRPM | HEART RATE: 82 BPM

## 2021-04-15 LAB
ATRIAL RATE: 89 BPM
FLUAV RNA RESP QL NAA+PROBE: NEGATIVE
FLUBV RNA RESP QL NAA+PROBE: NEGATIVE
P AXIS: 48 DEGREES
PR INTERVAL: 162 MS
QRS AXIS: 91 DEGREES
QRSD INTERVAL: 88 MS
QT INTERVAL: 350 MS
QTC INTERVAL: 425 MS
RSV RNA RESP QL NAA+PROBE: NEGATIVE
SARS-COV-2 RNA RESP QL NAA+PROBE: NEGATIVE
T WAVE AXIS: 14 DEGREES
VENTRICULAR RATE: 89 BPM

## 2021-04-15 PROCEDURE — 93010 ELECTROCARDIOGRAM REPORT: CPT | Performed by: INTERNAL MEDICINE

## 2021-04-15 RX ORDER — FAMOTIDINE 20 MG/1
20 TABLET, FILM COATED ORAL 2 TIMES DAILY
Qty: 14 TABLET | Refills: 0 | Status: SHIPPED | OUTPATIENT
Start: 2021-04-15

## 2021-04-15 RX ORDER — SUCRALFATE ORAL 1 G/10ML
1 SUSPENSION ORAL 4 TIMES DAILY
Qty: 420 ML | Refills: 0 | Status: SHIPPED | OUTPATIENT
Start: 2021-04-15

## 2021-04-15 NOTE — ED PROVIDER NOTES
History  Chief Complaint   Patient presents with    Chest Pain     26-year-old male presents for evaluation of retrosternal chest discomfort which started yesterday evening, he states that woke him up from sleep at 02:30 in the morning, he was able to go back to sleep and went to work was able to work in entire work day  He states that the pain was there the entire time but would intermittently get better worse  It was nonexertional, worse with laying flat  He also states that it is worse with cough and radiates to his back  He tried some Tums and some Tylenol this morning without any relief  He does state that he has had similar pain in the past but was never evaluated for it  He denies any nausea vomiting denies any diarrhea constipation denies any fevers or chills  He does have chronic cough as he is a daily smoker states it is nonproductive and not any worse than previous  The pain has been constant all evening and when he got back home from work he was going to get straight to the hospital however decided to stay home, have a normal dinner which did include some spicy foods as this is typical of his [address], he then fell asleep on the couch and woke up from continued pain and came in to be evaluated  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 and last week he had an approximately 15 minute interaction with a co-worker that later tested positive for Hank Minus  They were not wearing masks and were in close quarters in his office for approximately 15 minutes  He was not tested subsequently  He did not receive his COVID vaccination  States that at the end of the pandemic, December of 2019 he had what was deemed a bad flu with free pneumonia but was never hospitalized for it, he was also not tested for COVID at that time  Otherwise the patient denies any history of DVT or PE, does not have any history of hypertension, hyperlipidemia, diabetes, early history of cardiac disease in self or family    Has never had a cardiac workup in the past   He does not use a daily inhaler  Prior to Admission Medications   Prescriptions Last Dose Informant Patient Reported? Taking?   acetaminophen (TYLENOL) 325 mg tablet   No No   Sig: Take 3 tablets (975 mg total) by mouth every 8 (eight) hours   ketorolac (TORADOL) 10 mg tablet   No No   Sig: Take 1 tablet (10 mg total) by mouth every 6 (six) hours as needed for moderate pain   miconazole 2 % cream   No No   Sig: Apply topically 2 (two) times a day      Facility-Administered Medications: None       Past Medical History:   Diagnosis Date    Arthritis        Past Surgical History:   Procedure Laterality Date    INCISION AND DRAINAGE OF WOUND Left 2/22/2020    Procedure: INCISION AND DRAINAGE (I&D) EXTREMITY;  Surgeon: Kervin Cohn DPM;  Location: [facility] OR;  Service: Podiatry       History reviewed  No pertinent family history  I have reviewed and agree with the history as documented  E-Cigarette/Vaping    E-Cigarette Use Never User      E-Cigarette/Vaping Substances    Nicotine No     Flavoring No      Social History     Tobacco Use    Smoking status: Current Every Day Smoker     Packs/day: 2 00     Types: Cigarettes    Smokeless tobacco: Current User     Types: Chew   Substance Use Topics    Alcohol use: Yes     Alcohol/week: 12 0 standard drinks     Types: 12 Cans of beer per week     Frequency: 2-4 times a month     Drinks per session: 10 or more     Binge frequency: Less than monthly    Drug use: No       Review of Systems   Constitutional: Negative for appetite change, chills and fever  HENT: Negative for rhinorrhea and sore throat  Eyes: Negative for photophobia and visual disturbance  Respiratory: Positive for choking  Negative for cough, chest tightness and shortness of breath  Cardiovascular: Positive for chest pain  Negative for palpitations  Gastrointestinal: Negative for abdominal pain and diarrhea     Genitourinary: Negative for dysuria, frequency and urgency  Skin: Negative for rash  Neurological: Negative for dizziness and weakness  All other systems reviewed and are negative  Physical Exam  Physical Exam  Vitals signs and nursing note reviewed  Constitutional:       Appearance: He is well-developed  HENT:      Head: Normocephalic and atraumatic  Right Ear: External ear normal       Left Ear: External ear normal    Eyes:      Conjunctiva/sclera: Conjunctivae normal       Pupils: Pupils are equal, round, and reactive to light  Neck:      Musculoskeletal: Normal range of motion and neck supple  Vascular: No JVD  Trachea: No tracheal deviation  Cardiovascular:      Rate and Rhythm: Normal rate and regular rhythm  Heart sounds: Normal heart sounds  No murmur  No friction rub  No gallop  Pulmonary:      Effort: Pulmonary effort is normal  No respiratory distress  Breath sounds: No stridor  No wheezing or rales  Abdominal:      General: There is no distension  Palpations: Abdomen is soft  There is no mass  Tenderness: There is no abdominal tenderness  There is no guarding or rebound  Musculoskeletal: Normal range of motion  Skin:     General: Skin is warm and dry  Coloration: Skin is not pale  Findings: No erythema or rash  Neurological:      Mental Status: He is alert and oriented to person, place, and time  Cranial Nerves: No cranial nerve deficit           Vital Signs  ED Triage Vitals [04/14/21 2250]   Temperature Pulse Respirations Blood Pressure SpO2   98 1 °F (36 7 °C) 88 18 141/73 98 %      Temp Source Heart Rate Source Patient Position - Orthostatic VS BP Location FiO2 (%)   Oral Monitor Lying Left arm --      Pain Score       8           Vitals:    04/14/21 2330 04/15/21 0000 04/15/21 0100 04/15/21 0115   BP: 144/67 126/77 134/78 124/65   Pulse: 92 83 85 82   Patient Position - Orthostatic VS: Lying Lying Lying Lying         Visual Acuity      ED Medications  Medications   famotidine (PEPCID) injection 20 mg (20 mg Intravenous Given 4/14/21 2330)   aluminum-magnesium hydroxide-simethicone (MYLANTA) oral suspension 30 mL (30 mL Oral Given 4/14/21 2327)   Lidocaine Viscous HCl (XYLOCAINE) 2 % mucosal solution 15 mL (15 mL Swish & Swallow Given 4/14/21 2328)       Diagnostic Studies  Results Reviewed     Procedure Component Value Units Date/Time    COVID19, Influenza A/B, RSV PCR, [facility] [[id number]]  (Normal) Collected: 04/14/21 2320    Lab Status: Final result Specimen: Nares from Nasopharyngeal Swab Updated: 04/15/21 0009     SARS-CoV-2 Negative     INFLUENZA A PCR Negative     INFLUENZA B PCR Negative     RSV PCR Negative    Narrative: This test has been authorized by FDA under an EUA (Emergency Use Assay) for use by authorized laboratories  Clinical caution and judgement should be used with the interpretation of these results with consideration of the clinical impression and other laboratory testing  Testing reported as "Positive" or "Negative" has been proven to be accurate according to standard laboratory validation requirements  All testing is performed with control materials showing appropriate reactivity at standard intervals      Lipase [[id number]]  (Normal) Collected: 04/14/21 2253    Lab Status: Final result Specimen: Blood from Arm, Right Updated: 04/14/21 2339     Lipase 229 u/L     D-dimer, quantitative [[id number]]  (Normal) Collected: 04/14/21 2253    Lab Status: Final result Specimen: Blood from Arm, Right Updated: 04/14/21 2324     D-Dimer, Quant <0 27 ug/ml FEU     Troponin I [[id number]]  (Normal) Collected: 04/14/21 2253    Lab Status: Final result Specimen: Blood from Arm, Right Updated: 04/14/21 2321     Troponin I <0 02 ng/mL     Comprehensive metabolic panel [[id number]]  (Abnormal) Collected: 04/14/21 2253    Lab Status: Final result Specimen: Blood from Arm, Right Updated: 04/14/21 2319     Sodium 141 mmol/L      Potassium 4 1 mmol/L      Chloride 101 mmol/L      CO2 30 mmol/L      ANION GAP 10 mmol/L      BUN 9 mg/dL      Creatinine 0 89 mg/dL      Glucose 107 mg/dL      Calcium 9 5 mg/dL      AST 49 U/L       U/L      Alkaline Phosphatase 59 U/L      Total Protein 7 7 g/dL      Albumin 3 9 g/dL      Total Bilirubin 0 50 mg/dL      eGFR 112 ml/min/1 73sq m     Narrative:      National Kidney Disease Foundation guidelines for Chronic Kidney Disease (CKD):     Stage 1 with normal or high GFR (GFR > 90 mL/min/1 73 square meters)    Stage 2 Mild CKD (GFR = 60-89 mL/min/1 73 square meters)    Stage 3A Moderate CKD (GFR = 45-59 mL/min/1 73 square meters)    Stage 3B Moderate CKD (GFR = 30-44 mL/min/1 73 square meters)    Stage 4 Severe CKD (GFR = 15-29 mL/min/1 73 square meters)    Stage 5 End Stage CKD (GFR <15 mL/min/1 73 square meters)  Note: GFR calculation is accurate only with a steady state creatinine    Protime-INR [[id number]]  (Normal) Collected: 04/14/21 2253    Lab Status: Final result Specimen: Blood from Arm, Right Updated: 04/14/21 2318     Protime 12 4 seconds      INR 0 92    APTT [[id number]]  (Normal) Collected: 04/14/21 2253    Lab Status: Final result Specimen: Blood from Arm, Right Updated: 04/14/21 2318     PTT 29 seconds     CBC and differential [[id number]]  (Abnormal) Collected: 04/14/21 2253    Lab Status: Final result Specimen: Blood from Arm, Right Updated: 04/14/21 2301     WBC 10 97 Thousand/uL      RBC 4 90 Million/uL      Hemoglobin 15 5 g/dL      Hematocrit 44 4 %      MCV 91 fL      MCH 31 6 pg      MCHC 34 9 g/dL      RDW 12 9 %      MPV 9 5 fL      Platelets 814 Thousands/uL      nRBC 0 /100 WBCs      Neutrophils Relative 55 %      Immat GRANS % 0 %      Lymphocytes Relative 29 %      Monocytes Relative 11 %      Eosinophils Relative 4 %      Basophils Relative 1 %      Neutrophils Absolute 5 94 Thousands/µL      Immature Grans Absolute 0 04 Thousand/uL      Lymphocytes Absolute 3 23 Thousands/µL Monocytes Absolute 1 25 Thousand/µL      Eosinophils Absolute 0 46 Thousand/µL      Basophils Absolute 0 05 Thousands/µL                  XR chest 1 view portable   ED Interpretation by Pineda Hill MD (04/14 6958)                 Procedures  Procedures         ED Course  ED Course as of Apr 15 0117   Wed Apr 14, 2021   2335 ALT(!): 261   2335 AST(!): 49   Thu Apr 15, 2021   0101 Pain improved, patient with no right upper quadrant abdominal pain on deep palpation, no history of cholelithiasis  Discussed obtaining CT of the abdomen because of transaminitis as well as continued baseline pain however patient states that he wants to go home and sleep at this time, will return if symptoms worsen, will otherwise follow-up with gastroenterology for further care      0116 Bedside ultrasound difficult secondary to body habitus, no obvious distended gallbladder identified, patient with no right upper quadrant pain, feeling better, wants to be discharged at this time, will have patient follow-up with gastroenterology for further care,    Discussed brat diet , patient states he had Qdoba with spicy sauce and queso this afternoon prior to worsening of the pain                                SBIRT 22yo+      Most Recent Value   SBIRT (23 yo +)   In order to provide better care to our patients, we are screening all of our patients for alcohol and drug use  Would it be okay to ask you these screening questions?   No Filed at: 04/14/2021 2254                    [facility]  Number of Diagnoses or Management Options  Diagnosis management comments: 42-year-old male presents for evaluation of retrosternal chest pain radiating to the back, no current nausea vomiting, he is in no acute respiratory distress, with no acute ST changes on his EKG, he does have S1 q 3h T3 on his EKG, will obtain D-dimer in addition to cardiac workup ordered 1st nurse, will re-evaluate after treatment      Disposition  Final diagnoses:   Atypical chest pain     Time reflects when diagnosis was documented in both MDM as applicable and the Disposition within this note     Time User Action Codes Description Comment    4/15/2021  1:03 AM Osman Ang Add [R07 89] Atypical chest pain       ED Disposition     ED Disposition Condition Date/Time Comment    Discharge Stable Thu Apr 15, 2021  1:03 AM Anneliese Wing discharge to home/self care  Follow-up Information     Follow up With Specialties Details Why Contact Info Additional Conor Courtney [address] Emergency Department Emergency Medicine  If symptoms worsen [address] [address]  [address] [facility] Emergency Department, [address] [place], [facility]aWal[facility] [address]    [facility] Gastroenterology Specialists [facility] Gastroenterology Schedule an appointment as soon as possible for a visit   [facility] [place] [facility] [address]  [facility] Special[facility], [address], [address] [facility], [address] [phone]          Patient's Medications   Discharge Prescriptions    FAMOTIDINE (PEPCID) 20 MG TABLET    Take 1 tablet (20 mg total) by mouth 2 (two) times a day       Start Date: 4/15/2021 End Date: --       Order Dose: 20 mg       Quantity: 14 tablet    Refills: 0    SUCRALFATE (CARAFATE) 1 G/10 ML SUSPENSION    Take 10 mL (1 g total) by mouth 4 (four) times a day       Start Date: 4/15/2021 End Date: --       Order Dose: 1 g       Quantity: 420 mL    Refills: 0     No discharge procedures on file      PDMP Review     None          ED Provider  Electronically Signed by           Bismark Paz MD  04/15/21 9183

## 2021-04-15 NOTE — ED TRIAGE NOTES
Pt presents to ED via POV  Pt reports chest pain started last night, then able to fall asleep  Chest pain woke him up a 0230 am and has been intermittent and comes on when breathing in and coughing  Pt reports he was exposed to COVID positive person last Thursday  Pain is a 8/10 and is tight, also pain is in back  Pt took tylenol this morning at 0700

## (undated) DEVICE — GLOVE INDICATOR PI UNDERGLOVE SZ 7 BLUE

## (undated) DEVICE — CURITY NON-ADHERENT STRIPS: Brand: CURITY

## (undated) DEVICE — GLOVE SRG LF STRL BGL SKNSNS 6.5 PF

## (undated) DEVICE — GLOVE SRG BIOGEL 7

## (undated) DEVICE — INTENDED FOR TISSUE SEPARATION, AND OTHER PROCEDURES THAT REQUIRE A SHARP SURGICAL BLADE TO PUNCTURE OR CUT.: Brand: BARD-PARKER SAFETY BLADES SIZE 15, STERILE

## (undated) DEVICE — ASTOUND STANDARD SURGICAL GOWN, XXL: Brand: CONVERTORS

## (undated) DEVICE — GLOVE SRG BIOGEL 9

## (undated) DEVICE — BETHLEHEM UNIVERSAL  MIONR EXT: Brand: CARDINAL HEALTH

## (undated) DEVICE — ACE WRAP 4 IN UNSTERILE

## (undated) DEVICE — CURITY STRETCH BANDAGE: Brand: CURITY

## (undated) DEVICE — GLOVE INDICATOR PI UNDERGLOVE SZ 7.5 BLUE

## (undated) DEVICE — KERLIX BANDAGE ROLL: Brand: KERLIX

## (undated) DEVICE — IODOFORM PACKING STRIP: Brand: CURITY

## (undated) DEVICE — THE SIMPULSE SOLO SYSTEM WITH ULTREX RETRACTABLE SPLASH SHIELD TIP: Brand: SIMPULSE SOLO